# Patient Record
Sex: FEMALE | Race: BLACK OR AFRICAN AMERICAN | NOT HISPANIC OR LATINO | Employment: FULL TIME | URBAN - METROPOLITAN AREA
[De-identification: names, ages, dates, MRNs, and addresses within clinical notes are randomized per-mention and may not be internally consistent; named-entity substitution may affect disease eponyms.]

---

## 2017-05-15 ENCOUNTER — ALLSCRIPTS OFFICE VISIT (OUTPATIENT)
Dept: OTHER | Facility: OTHER | Age: 52
End: 2017-05-15

## 2017-05-15 DIAGNOSIS — R68.82 DECREASED LIBIDO: ICD-10-CM

## 2018-01-14 VITALS
HEIGHT: 64 IN | OXYGEN SATURATION: 98 % | HEART RATE: 82 BPM | SYSTOLIC BLOOD PRESSURE: 108 MMHG | BODY MASS INDEX: 36.19 KG/M2 | TEMPERATURE: 97.4 F | WEIGHT: 212 LBS | RESPIRATION RATE: 16 BRPM | DIASTOLIC BLOOD PRESSURE: 68 MMHG

## 2018-01-16 DIAGNOSIS — Z12.31 ENCOUNTER FOR SCREENING MAMMOGRAM FOR MALIGNANT NEOPLASM OF BREAST: ICD-10-CM

## 2018-01-23 NOTE — PROGRESS NOTES
Assessment  Assessed    1  Acquired pes planus of left foot (734) (M21 42)    Plan  Left foot pain    · * XR FOOT 3+ VIEW LEFT; Status:Active - Retrospective By Protocol Authorization; Requested for:27Apr2016; There is arthritic change into the left ankle at the tibiotalar joint but also a severe flatfoot deformity  I wrote her for physical therapy for the ankle arthritis to help with her strengthening and range of motion  She also will see podiatry for orthotic management  This is likely posttraumatic and I will see her back as needed  Discussion/Summary  The patient was counseled regarding diagnostic results, instructions for management, prognosis, patient and family education, impressions, risks and benefits of treatment options, importance of compliance with treatment  Chief Complaint  Chief Complaints    1  Ankle Pain    History of Present Illness   Vinay Love is a 63-year-old female who returns to see me today because she has pain about her left ankle  The pain is sharp and moderate intermittent and worse with walking but better with rest and radiates posterior laterally  She did have an ankle fracture 2 years ago that we treated closed  This was a fibular fracture  She has difficulty walking on her feet at her job  Review of Systems    Constitutional: No fever, no chills, feels well, no tiredness, no recent weight gain or loss  Eyes: No complaints of eyesight problems, no red eyes  ENT: no loss of hearing, no nosebleeds, no sore throat  Cardiovascular: No complaints of chest pain, no palpitations, no leg claudication or lower extremity edema  Respiratory: no compliants of shortness of breath, no wheezing, no cough  Gastrointestinal: no complaints of abdominal pain, no constipation, no nausea or diarrhea, no vomiting, no bloody stools  Genitourinary: no complaints of dysuria, no incontinence  Musculoskeletal: as noted in HPI     Integumentary: no complaints of skin rash or lesion, no itching or dry skin, no skin wounds  Neurological: no complaints of headache, no confusion, no numbness or tingling, no dizziness  Endocrine: No complaints of muscle weakness, no feelings of weakness, no frequent urination, no excessive thirst    Psychiatric: No suicidal thoughts, no anxiety, no feelings of depression  Active Problems  Problems    1  Anxiety (300 00) (F41 9)   2  BMI 36 0-36 9,adult (V85 36) (Z68 36)   3  Chronic pain of left ankle (263 60,172 73) (M25 572,G89 29)   4  Closed Fracture Of The Left Lateral Malleolus (824 2)   5  Colon cancer screening (V76 51) (Z12 11)   6  Encounter for cervical Pap smear with pelvic exam (V76 2,V72 31) (Z01 419)   7  Encounter for routine gynecological examination (V72 31) (Z01 419)   8  Encounter for screening mammogram for malignant neoplasm of breast (V76 12)   (Z12 31)   9  Left foot pain (729 5) (M79 672)   10  Seasonal allergies (477 9) (J30 2)   11  Urinary Symptoms (788 69)   12  Urinary tract infection (599 0) (N39 0)   13  Well adult on routine health check (V70 0) (Z00 00)    Past Medical History    The active problems and past medical history were reviewed and updated today  Surgical History    The surgical history was reviewed and updated today  Family History  Family History    · Family history of Arthritis (V17 7)    The family history was reviewed and updated today  Social History  Problems    · Denied: History of Alcohol Use (History)   · Denied: History of Alcohol Use (History)   · Denied: History of Drug Use   · Never A Smoker   · Never A Smoker  The social history was reviewed and updated today  Current Meds   1  Ipratropium Bromide 0 06 % Nasal Solution; Two sprays in each nostril three to four   times daily as needed; Therapy: [id number] to (Last Rx:16Nov2015) Ordered   2  Loratadine 10 MG Oral Tablet; Take one tablet daily as needed; Therapy: 10UTD9523 to (Last Rx:16Nov2015) Ordered   3   PARoxetine HCl - 20 MG Oral Tablet; take 1 tablet by mouth once daily; Therapy: 43DRE2740 to (Evaluate:06Xkn8565)  Requested for: 20HYM4408; Last   Rx:13Jan2016 Ordered    The medication list was reviewed and updated today  Allergies  Medication    1  No Known Drug Allergies  Non-Medication    2  No Known Food Allergies   3  No Known Latex Allergies    Physical Exam    Left Ankle: Appearance: Severe pes planus deformity, but no lateral swelling and no medial swelling  Tenderness: None  Dorsiflexion: painful restricted AROM 10 degrees  Plantar flexion: painful restricted AROM 25 degrees  Inversion: restricted AROM  Eversion: restricted AROM 5 degrees  Dorsiflexion was 5/5  Plantar flexion was 5/5  Constitutional - General appearance: Normal    Musculoskeletal - Gait and station: Normal  Digits and nails: Normal  Muscle strength/tone: Normal  Lower extremity compartments: Normal    Cardiovascular - Pulses: Normal  Examination of extremities for edema and/or varicosities: Normal    Skin - Skin and subcutaneous tissue: Normal    Neurologic - Sensation: Normal  Lower extremity peripheral neuro exam: Normal  Peripheral sensation findings: involved side light touch intact on the anterior shin, involved side light touch intact on the lateral foot, involved side light touch intact on the medial aspect of the foot and involved side light touch intact on the plantar surface of the foot  Neuromuscular strength examination findings: involved side normal foot eversion, involved side normal foot inversion and involved side normal great toe extension  Distal pulse examination findings: involved side 2+ dorsalis pedis pulse  Psychiatric - Orientation to person, place, and time: Normal  Mood and affect: Normal    Eyes   Conjunctiva and lids: Normal     Pupils and irises: Normal        Results/Data  I personally reviewed the films/images/results in the office today  My interpretation follows     X-ray Review Left foot x-rays that demonstrate significant pes planus deformity and mild arthritic changes at the tibiotalar joint but a well-healed distal fibular fracture        Signatures   Electronically signed by : FRANKY Griffith ; Apr 27 2016  3:23PM EST                       (Author)

## 2018-01-29 ENCOUNTER — OFFICE VISIT (OUTPATIENT)
Dept: FAMILY MEDICINE CLINIC | Facility: CLINIC | Age: 53
End: 2018-01-29
Payer: COMMERCIAL

## 2018-01-29 VITALS
SYSTOLIC BLOOD PRESSURE: 132 MMHG | HEIGHT: 64 IN | TEMPERATURE: 98.8 F | BODY MASS INDEX: 36.02 KG/M2 | DIASTOLIC BLOOD PRESSURE: 88 MMHG | OXYGEN SATURATION: 99 % | HEART RATE: 88 BPM | RESPIRATION RATE: 18 BRPM | WEIGHT: 211 LBS

## 2018-01-29 DIAGNOSIS — Z12.11 SCREENING FOR COLON CANCER: ICD-10-CM

## 2018-01-29 DIAGNOSIS — Z13.6 SCREENING FOR CARDIOVASCULAR CONDITION: ICD-10-CM

## 2018-01-29 DIAGNOSIS — F33.0 MILD EPISODE OF RECURRENT MAJOR DEPRESSIVE DISORDER (HCC): Primary | ICD-10-CM

## 2018-01-29 DIAGNOSIS — Z12.4 PAP SMEAR FOR CERVICAL CANCER SCREENING: ICD-10-CM

## 2018-01-29 DIAGNOSIS — Z11.3 SCREENING FOR STD (SEXUALLY TRANSMITTED DISEASE): ICD-10-CM

## 2018-01-29 DIAGNOSIS — Z13.1 SCREENING FOR DIABETES MELLITUS (DM): ICD-10-CM

## 2018-01-29 DIAGNOSIS — R09.81 CONGESTION OF NASAL SINUS: ICD-10-CM

## 2018-01-29 PROCEDURE — 99213 OFFICE O/P EST LOW 20 MIN: CPT | Performed by: STUDENT IN AN ORGANIZED HEALTH CARE EDUCATION/TRAINING PROGRAM

## 2018-01-29 RX ORDER — PAROXETINE HYDROCHLORIDE 20 MG/1
20 TABLET, FILM COATED ORAL DAILY
Refills: 2 | COMMUNITY
Start: 2017-12-19 | End: 2018-02-20 | Stop reason: SDUPTHER

## 2018-01-29 RX ORDER — FLUTICASONE PROPIONATE 50 MCG
1 SPRAY, SUSPENSION (ML) NASAL DAILY
Qty: 16 G | Refills: 0 | Status: SHIPPED | OUTPATIENT
Start: 2018-01-29 | End: 2019-09-26 | Stop reason: ALTCHOICE

## 2018-01-29 RX ORDER — LORATADINE 10 MG/1
1 TABLET ORAL DAILY PRN
COMMUNITY
Start: 2015-11-16 | End: 2019-09-26 | Stop reason: ALTCHOICE

## 2018-01-29 NOTE — PATIENT INSTRUCTIONS
Continue with routine gynecologic care, and have mammo performed  Please have bloodwork done, and I will call with results  May follow up in 1 yr, and if stress does not let up, please call to make an appt for an increase in your paxil

## 2018-01-29 NOTE — PROGRESS NOTES
Assessment/Plan:    No problem-specific Assessment & Plan notes found for this encounter  Diagnoses and all orders for this visit:    Mild episode of recurrent major depressive disorder ([facility] [address] )    Pap smear for cervical cancer screening  -     Pap IG, rfx HPV all pth; Future    Screening for cardiovascular condition  -     CBC and differential; Future  -     Comprehensive metabolic panel; Future  -     Lipid panel; Future  -     CBC and differential  -     Comprehensive metabolic panel  -     Lipid panel    Screening for diabetes mellitus (DM)  -     Hemoglobin A1c; Future  -     Hemoglobin A1c    Screening for colon cancer  -     Ambulatory referral to Gastroenterology; Future    Screening for STD (sexually transmitted disease)  -     Genital Comprehensive Culture; Future  -     Chlamydia/GC amplified DNA by PCR; Future  -     Genital Comprehensive Culture  -     Chlamydia/GC amplified DNA by PCR    Congestion of nasal sinus  -     fluticasone (FLONASE) 50 mcg/act nasal spray; 1 spray into each nostril daily    Other orders  -     loratadine (CLARITIN) 10 mg tablet; Take 1 tablet by mouth daily as needed  -     PARoxetine (PAXIL) 20 mg tablet; Take 20 mg by mouth daily        Continue with routine gynecologic care, and have mammo performed  Please have bloodwork done, and I will call with results  May follow up in 1 yr, and if stress does not let up, please call to make an appt for an increase in your paxil  Subjective:      Patient ID: Tim Onofre is a 46 y o  female  Pt here today for CPE and PAP  Pt is a coventry pt, 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  Pt has a lot of stress at work, and works at [employer], 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, and moves furniture around  Pt feels a great deal of stress when she walks in the building, has been there 22 yrs, but wants to leave and can't  Pt is from [place] and feels stuck at her job  Pt has two children and is a single mom   Pt has jury duty slip, and pt has no time at this moment to miss work for it, would like her letter  Pt would like refill of flonase, which she had in the past, bc her breathing is congested and worse than normal  Pt takes paxil for depression, but has refills of it currently  PHQ-9 score of 11  Pt feels very low at work, and when she is tired, and she sometimes loses her temper at work  Pt unsure if she needs a higher dose  No pain at this time  Pt has IUD, needs to be removed in June  The following portions of the patient's history were reviewed and updated as appropriate: allergies, current medications, past family history, past medical history, past social history, past surgical history and problem list     Review of Systems   Constitutional: Negative for chills, diaphoresis, fatigue and fever  HENT: Positive for congestion and postnasal drip  Negative for ear pain, sneezing and sore throat  Dry mouth, often drinking tons of fluids  Respiratory: Negative for cough and shortness of breath  Pt occasionally wakes in the night gasping for air, that she attributes to dry mouth  Cardiovascular: Negative for chest pain, palpitations and leg swelling  Gastrointestinal: Negative for abdominal pain, constipation, diarrhea, nausea and vomiting  Genitourinary: Negative for difficulty urinating, dyspareunia, dysuria, hematuria, menstrual problem, pelvic pain, urgency, vaginal bleeding and vaginal discharge  Pt has IUD, doesn't get periods  Musculoskeletal: Negative for arthralgias, back pain and myalgias  Neurological: Positive for headaches  Negative for dizziness, syncope and light-headedness  Psychiatric/Behavioral: Positive for agitation  The patient is nervous/anxious  Due to work stress  Objective:     Physical Exam   Constitutional: She is oriented to person, place, and time  She appears well-developed and well-nourished  No distress  HENT:   Head: Normocephalic and atraumatic     Right Ear: External ear normal    Left Ear: External ear normal    Nose: Nose normal    Mouth/Throat: Oropharynx is clear and moist  No oropharyngeal exudate  Eyes: Right eye exhibits no discharge  Left eye exhibits no discharge  No scleral icterus  Neck: Normal range of motion  Neck supple  No thyromegaly present  Cardiovascular: Normal rate and regular rhythm  Exam reveals no gallop and no friction rub  No murmur heard  Pulmonary/Chest: Effort normal and breath sounds normal  No stridor  No respiratory distress  Abdominal: Soft  Bowel sounds are normal  She exhibits no distension and no mass  There is no tenderness  There is no rebound and no guarding  Musculoskeletal: Normal range of motion  She exhibits no edema, tenderness or deformity  Lymphadenopathy:     She has no cervical adenopathy  Neurological: She is alert and oriented to person, place, and time  No cranial nerve deficit  Skin: Skin is warm and dry  No rash noted  She is not diaphoretic  No erythema  No pallor  Psychiatric: She has a normal mood and affect  Her behavior is normal  Judgment and thought content normal    Vitals reviewed        Vitals:    01/29/18 0837   BP: 132/88   Pulse: 88   Resp: 18   Temp: 98 8 °F (37 1 °C)   SpO2: 99%

## 2018-01-29 NOTE — LETTER
January 29, 2018     No Recipients    Patient: Lanny Santos   YOB: 1965   Date of Visit: 1/29/2018       Dear Dr Sherry Bashir Recipients: Thank you for referring Lanny Santos to me for evaluation  Below are my notes for this consultation  If you have questions, please do not hesitate to call me  I look forward to following your patient along with you  Sincerely,        Bushra Nye DO        CC: No Recipients  Bushra Nye DO  1/29/2018  9:10 AM  Sign at close encounter  Assessment/Plan:    No problem-specific Assessment & Plan notes found for this encounter  There are no diagnoses linked to this encounter  Subjective:      Patient ID: Lanny Santos is a 46 y o  female  Pt here today for CPE and PAP  Pt is a coventry pt, 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  Pt has a lot of stress at work, and works at [employer], 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, and moves furniture around  Pt feels a great deal of stress when she walks in the building, has been there 22 yrs, but wants to leave and can't  Pt is from [place] and feels stuck at her job  Pt has two children and is a single mom  Pt has jury duty slip, and pt has no time at this moment to miss work for it, would like her letter  Pt would like refill of flonase, which she had in the past, bc her breathing is congested and worse than normal  Pt takes paxil for depression, but has refills of it currently  PHQ-9 score of 11  Pt feels very low at work, and when she is tired, and she sometimes loses her temper at work  Pt unsure if she needs a higher dose  No pain at this time  Pt has IUD, needs to be removed in June  The following portions of the patient's history were reviewed and updated as appropriate: allergies, current medications, past family history, past medical history, past social history, past surgical history and problem list     Review of Systems   Constitutional: Negative for chills, diaphoresis, fatigue and fever     HENT: Positive for congestion and postnasal drip  Negative for ear pain, sneezing and sore throat  Dry mouth, often drinking tons of fluids  Respiratory: Negative for cough and shortness of breath  Pt occasionally wakes in the night gasping for air, that she attributes to dry mouth  Cardiovascular: Negative for chest pain, palpitations and leg swelling  Gastrointestinal: Negative for abdominal pain, constipation, diarrhea, nausea and vomiting  Genitourinary: Negative for difficulty urinating, dyspareunia, dysuria, hematuria, menstrual problem, pelvic pain, urgency, vaginal bleeding and vaginal discharge  Pt has IUD, doesn't get periods  Musculoskeletal: Negative for arthralgias, back pain and myalgias  Neurological: Positive for headaches  Negative for dizziness, syncope and light-headedness  Psychiatric/Behavioral: Positive for agitation  The patient is nervous/anxious  Due to work stress  Objective:     Physical Exam   Constitutional: She is oriented to person, place, and time  She appears well-developed and well-nourished  No distress  HENT:   Head: Normocephalic and atraumatic  Right Ear: External ear normal    Left Ear: External ear normal    Nose: Nose normal    Mouth/Throat: Oropharynx is clear and moist  No oropharyngeal exudate  Eyes: Right eye exhibits no discharge  Left eye exhibits no discharge  No scleral icterus  Neck: Normal range of motion  Neck supple  No thyromegaly present  Cardiovascular: Normal rate and regular rhythm  Exam reveals no gallop and no friction rub  No murmur heard  Pulmonary/Chest: Effort normal and breath sounds normal  No stridor  No respiratory distress  Abdominal: Soft  Bowel sounds are normal  She exhibits no distension and no mass  There is no tenderness  There is no rebound and no guarding  Musculoskeletal: Normal range of motion  She exhibits no edema, tenderness or deformity     Lymphadenopathy:     She has no cervical adenopathy  Neurological: She is alert and oriented to person, place, and time  No cranial nerve deficit  Skin: Skin is warm and dry  No rash noted  She is not diaphoretic  No erythema  No pallor  Psychiatric: She has a normal mood and affect  Her behavior is normal  Judgment and thought content normal    Vitals reviewed        Vitals:    01/29/18 0837   BP: 132/88   Pulse: 88   Resp: 18   Temp: 98 8 °F (37 1 °C)   SpO2: 99%

## 2018-01-29 NOTE — LETTER
Date: 1/29/2018    To whom it may concern: This is to certify that Yasemin Garcia has been under my care for the following diagnosis: Major Depressive Disorder  This is affecting her stress, judgment, and mental strength  This would only add more to her condition, and I feel it would be harmful to her  I feel that she is unable to serve on Jury Duty at this time for the above mentioned medical reasons                   Sincerely,   Dave Watkins, DO

## 2018-02-01 LAB
CYTOLOGIST CVX/VAG CYTO: NORMAL
DX ICD CODE: NORMAL
OTHER STN SPEC: NORMAL
OTHER STN SPEC: NORMAL
PATH REPORT.FINAL DX SPEC: NORMAL
SL AMB NOTE:: NORMAL
SL AMB SPECIMEN ADEQUACY: NORMAL
SL AMB TEST METHODOLOGY: NORMAL

## 2018-02-01 NOTE — PROGRESS NOTES
I have reviewed the notes, assessments, and/or procedures performed by the resident, I concur with her/his documentation of Martha Mcginnis

## 2018-02-02 ENCOUNTER — TELEPHONE (OUTPATIENT)
Dept: OBGYN CLINIC | Facility: CLINIC | Age: 53
End: 2018-02-02

## 2018-02-02 LAB
BACTERIA GENITAL AEROBE CULT: NORMAL
C TRACH RRNA SPEC QL NAA+PROBE: NEGATIVE
Lab: NORMAL
N GONORRHOEA RRNA SPEC QL NAA+PROBE: NEGATIVE

## 2018-02-08 LAB
ALBUMIN SERPL-MCNC: 4.5 G/DL (ref 3.5–5.5)
ALBUMIN/GLOB SERPL: 1.3 {RATIO} (ref 1.2–2.2)
ALP SERPL-CCNC: 102 IU/L (ref 39–117)
ALT SERPL-CCNC: 40 IU/L (ref 0–32)
AST SERPL-CCNC: 32 IU/L (ref 0–40)
BASOPHILS # BLD AUTO: 0 X10E3/UL (ref 0–0.2)
BASOPHILS NFR BLD AUTO: 0 %
BILIRUB SERPL-MCNC: 0.4 MG/DL (ref 0–1.2)
BUN SERPL-MCNC: 12 MG/DL (ref 6–24)
BUN/CREAT SERPL: 18 (ref 9–23)
CALCIUM SERPL-MCNC: 9.3 MG/DL (ref 8.7–10.2)
CHLORIDE SERPL-SCNC: 98 MMOL/L (ref 96–106)
CHOLEST SERPL-MCNC: 176 MG/DL (ref 100–199)
CHOLEST/HDLC SERPL: 3.1 RATIO UNITS (ref 0–4.4)
CO2 SERPL-SCNC: 25 MMOL/L (ref 18–29)
CREAT SERPL-MCNC: 0.66 MG/DL (ref 0.57–1)
EOSINOPHIL # BLD AUTO: 0.1 X10E3/UL (ref 0–0.4)
EOSINOPHIL NFR BLD AUTO: 1 %
ERYTHROCYTE [DISTWIDTH] IN BLOOD BY AUTOMATED COUNT: 14.9 % (ref 12.3–15.4)
EST. AVERAGE GLUCOSE BLD GHB EST-MCNC: 111 MG/DL
GLOBULIN SER-MCNC: 3.6 G/DL (ref 1.5–4.5)
GLUCOSE SERPL-MCNC: 94 MG/DL (ref 65–99)
HBA1C MFR BLD: 5.5 % (ref 4.8–5.6)
HCT VFR BLD AUTO: 37.4 % (ref 34–46.6)
HDLC SERPL-MCNC: 57 MG/DL
HGB BLD-MCNC: 12.3 G/DL (ref 11.1–15.9)
IMM GRANULOCYTES # BLD: 0 X10E3/UL (ref 0–0.1)
IMM GRANULOCYTES NFR BLD: 0 %
LDLC SERPL CALC-MCNC: 102 MG/DL (ref 0–99)
LYMPHOCYTES # BLD AUTO: 3.2 X10E3/UL (ref 0.7–3.1)
LYMPHOCYTES NFR BLD AUTO: 33 %
MCH RBC QN AUTO: 27.9 PG (ref 26.6–33)
MCHC RBC AUTO-ENTMCNC: 32.9 G/DL (ref 31.5–35.7)
MCV RBC AUTO: 85 FL (ref 79–97)
MONOCYTES # BLD AUTO: 0.6 X10E3/UL (ref 0.1–0.9)
MONOCYTES NFR BLD AUTO: 6 %
NEUTROPHILS # BLD AUTO: 5.8 X10E3/UL (ref 1.4–7)
NEUTROPHILS NFR BLD AUTO: 60 %
PLATELET # BLD AUTO: 300 X10E3/UL (ref 150–379)
POTASSIUM SERPL-SCNC: 4.5 MMOL/L (ref 3.5–5.2)
PROT SERPL-MCNC: 8.1 G/DL (ref 6–8.5)
RBC # BLD AUTO: 4.41 X10E6/UL (ref 3.77–5.28)
SL AMB EGFR AFRICAN AMERICAN: 117 ML/MIN/1.73
SL AMB EGFR NON AFRICAN AMERICAN: 102 ML/MIN/1.73
SL AMB VLDL CHOLESTEROL CALC: 17 MG/DL (ref 5–40)
SODIUM SERPL-SCNC: 138 MMOL/L (ref 134–144)
TRIGL SERPL-MCNC: 87 MG/DL (ref 0–149)
WBC # BLD AUTO: 9.8 X10E3/UL (ref 3.4–10.8)

## 2018-02-12 LAB
C TRACH RRNA SPEC QL NAA+PROBE: NEGATIVE
N GONORRHOEA RRNA SPEC QL NAA+PROBE: NEGATIVE

## 2018-02-20 DIAGNOSIS — F32.A DEPRESSION, CONTROLLED: Primary | ICD-10-CM

## 2018-02-23 RX ORDER — PAROXETINE HYDROCHLORIDE 20 MG/1
20 TABLET, FILM COATED ORAL DAILY
Qty: 30 TABLET | Refills: 3 | Status: SHIPPED | OUTPATIENT
Start: 2018-02-23 | End: 2018-03-19 | Stop reason: SDUPTHER

## 2018-03-19 DIAGNOSIS — F32.A DEPRESSION, CONTROLLED: ICD-10-CM

## 2018-03-19 RX ORDER — PAROXETINE HYDROCHLORIDE 20 MG/1
20 TABLET, FILM COATED ORAL DAILY
Qty: 30 TABLET | Refills: 2 | Status: SHIPPED | OUTPATIENT
Start: 2018-03-19 | End: 2018-06-13 | Stop reason: SDUPTHER

## 2018-03-19 NOTE — TELEPHONE ENCOUNTER
Pt needs refill sent to [facility], [address], [place] [facility], [address]  [place]

## 2018-04-12 ENCOUNTER — HOSPITAL ENCOUNTER (OUTPATIENT)
Dept: RADIOLOGY | Facility: HOSPITAL | Age: 53
Discharge: HOME | End: 2018-04-12
Attending: FAMILY MEDICINE
Payer: COMMERCIAL

## 2018-04-12 DIAGNOSIS — Z12.31 ENCOUNTER FOR SCREENING MAMMOGRAM FOR MALIGNANT NEOPLASM OF BREAST: ICD-10-CM

## 2018-04-12 PROCEDURE — 77067 SCR MAMMO BI INCL CAD: CPT

## 2018-06-07 DIAGNOSIS — F32.A DEPRESSION, CONTROLLED: ICD-10-CM

## 2018-06-07 RX ORDER — PAROXETINE HYDROCHLORIDE 20 MG/1
20 TABLET, FILM COATED ORAL DAILY
Qty: 30 TABLET | Refills: 0 | Status: CANCELLED | OUTPATIENT
Start: 2018-06-07 | End: 2018-07-07

## 2018-06-13 ENCOUNTER — OFFICE VISIT (OUTPATIENT)
Dept: FAMILY MEDICINE CLINIC | Facility: CLINIC | Age: 53
End: 2018-06-13
Payer: COMMERCIAL

## 2018-06-13 VITALS
RESPIRATION RATE: 20 BRPM | HEART RATE: 88 BPM | OXYGEN SATURATION: 99 % | DIASTOLIC BLOOD PRESSURE: 80 MMHG | SYSTOLIC BLOOD PRESSURE: 132 MMHG

## 2018-06-13 DIAGNOSIS — F32.A DEPRESSION, CONTROLLED: ICD-10-CM

## 2018-06-13 DIAGNOSIS — G44.209 ACUTE NON INTRACTABLE TENSION-TYPE HEADACHE: Primary | ICD-10-CM

## 2018-06-13 PROCEDURE — 99213 OFFICE O/P EST LOW 20 MIN: CPT | Performed by: FAMILY MEDICINE

## 2018-06-13 RX ORDER — PAROXETINE HYDROCHLORIDE 20 MG/1
20 TABLET, FILM COATED ORAL DAILY
Qty: 10 TABLET | Refills: 3 | Status: SHIPPED | OUTPATIENT
Start: 2018-06-13 | End: 2018-09-06 | Stop reason: SDUPTHER

## 2018-06-13 NOTE — LETTER
June 13, 2018     Patient: Nam Tai   YOB: 1965   Date of Visit: 6/13/2018       To Whom it May Concern:    Nam Tai is under my professional care  She was seen in my office on 6/13/2018  She may return to work on Monday 6/18/18  Patient was involved with  Stressful work scenario, has been having headaches and not feeling  Like herself  Patient is having issues with errors at work and would benefit from 2 days off, to rest and on wide from stressor  If you have any questions or concerns, please don't hesitate to call           Sincerely,          Sue Mccrary, DO        CC: No Recipients

## 2018-09-06 ENCOUNTER — OFFICE VISIT (OUTPATIENT)
Dept: FAMILY MEDICINE CLINIC | Facility: CLINIC | Age: 53
End: 2018-09-06
Payer: COMMERCIAL

## 2018-09-06 VITALS
TEMPERATURE: 98.7 F | OXYGEN SATURATION: 98 % | BODY MASS INDEX: 35.87 KG/M2 | DIASTOLIC BLOOD PRESSURE: 60 MMHG | HEART RATE: 77 BPM | SYSTOLIC BLOOD PRESSURE: 108 MMHG | WEIGHT: 209 LBS

## 2018-09-06 DIAGNOSIS — F32.A DEPRESSION, CONTROLLED: ICD-10-CM

## 2018-09-06 DIAGNOSIS — M54.50 ACUTE BILATERAL LOW BACK PAIN WITHOUT SCIATICA: Primary | ICD-10-CM

## 2018-09-06 PROCEDURE — 99213 OFFICE O/P EST LOW 20 MIN: CPT | Performed by: FAMILY MEDICINE

## 2018-09-06 RX ORDER — PAROXETINE HYDROCHLORIDE 20 MG/1
20 TABLET, FILM COATED ORAL DAILY
Qty: 30 TABLET | Refills: 2 | Status: SHIPPED | OUTPATIENT
Start: 2018-09-06 | End: 2019-06-25 | Stop reason: SDUPTHER

## 2018-09-06 RX ORDER — MELOXICAM 7.5 MG/1
7.5 TABLET ORAL DAILY
Qty: 14 TABLET | Refills: 0 | Status: SHIPPED | OUTPATIENT
Start: 2018-09-06 | End: 2019-09-26 | Stop reason: ALTCHOICE

## 2018-09-09 NOTE — PROGRESS NOTES
Assessment/Plan:    No problem-specific Assessment & Plan notes found for this encounter  Diagnoses and all orders for this visit:    Acute bilateral low back pain without sciatica  -     meloxicam (MOBIC) 7 5 mg tablet; Take 1 tablet (7 5 mg total) by mouth daily  Likely from repetitive movements from work  Will try conservative measurements at this time  Depression, controlled  -     PARoxetine (PAXIL) 20 mg tablet; Take 1 tablet (20 mg total) by mouth daily for 30 days        Subjective:      Patient ID: Noah Amin is a 46 y o  female  79-year-old female with history of depression presents with lower back pain  Patient is currently a  and repetitive movements of bending down have made the pain worse  No trauma to the area  Patient also needs a refill for medications for depression  She denies any chest pain, shortness of breath, or abdominal pain  Back Pain   The current episode started 1 to 4 weeks ago  The problem occurs intermittently  The problem has been waxing and waning since onset  The pain is present in the sacro-iliac  The quality of the pain is described as burning  The pain does not radiate  The pain is at a severity of 5/10  The pain is mild  The pain is the same all the time  The symptoms are aggravated by bending, position, stress and twisting  Pertinent negatives include no abdominal pain, bladder incontinence, chest pain, dysuria, fever, headaches, leg pain, paresthesias, tingling or weakness  She has tried nothing for the symptoms  The following portions of the patient's history were reviewed and updated as appropriate: allergies, current medications, past family history, past medical history, past social history, past surgical history and problem list     Review of Systems   Constitutional: Negative for fever  HENT: Negative for sore throat  Respiratory: Negative for cough and shortness of breath  Cardiovascular: Negative for chest pain  Gastrointestinal: Negative for abdominal pain, constipation, nausea and vomiting  Genitourinary: Negative for bladder incontinence and dysuria  Musculoskeletal: Positive for back pain  Negative for gait problem  Neurological: Negative for dizziness, tingling, weakness, light-headedness, headaches and paresthesias  Psychiatric/Behavioral: Negative for agitation  Objective:      /60 (BP Location: Left arm, Patient Position: Sitting, Cuff Size: Large)   Pulse 77   Temp 98 7 °F (37 1 °C) (Tympanic)   Wt 94 8 kg (209 lb)   SpO2 98%   BMI 35 87 kg/m²          Physical Exam   Constitutional: She is oriented to person, place, and time  She appears well-developed and well-nourished  No distress  HENT:   Head: Normocephalic and atraumatic  Nose: Nose normal    Mouth/Throat: No oropharyngeal exudate  Eyes: EOM are normal  Right eye exhibits no discharge  Left eye exhibits no discharge  Neck: Normal range of motion  Neck supple  Cardiovascular: Normal rate, regular rhythm, normal heart sounds and intact distal pulses  No murmur heard  Pulmonary/Chest: Effort normal and breath sounds normal  No respiratory distress  Abdominal: Soft  Bowel sounds are normal  She exhibits no distension  There is no tenderness  Musculoskeletal: Normal range of motion  She exhibits no edema  No pain with straight leg test    Sensation intact  Reflexes intact  Neurological: She is alert and oriented to person, place, and time  Skin: Skin is warm  Psychiatric: She has a normal mood and affect   Her behavior is normal

## 2019-03-18 ENCOUNTER — TELEPHONE (OUTPATIENT)
Dept: FAMILY MEDICINE CLINIC | Facility: CLINIC | Age: 54
End: 2019-03-18

## 2019-03-18 DIAGNOSIS — Z12.31 SCREENING MAMMOGRAM, ENCOUNTER FOR: Primary | ICD-10-CM

## 2019-03-18 NOTE — TELEPHONE ENCOUNTER
Tried calling Pt to inform her that Order for Mammo was ready for P/u/ Busy signal, will try back   Form is in Pt P/U

## 2019-05-23 ENCOUNTER — HOSPITAL ENCOUNTER (OUTPATIENT)
Dept: RADIOLOGY | Facility: HOSPITAL | Age: 54
Discharge: HOME | End: 2019-05-23
Payer: COMMERCIAL

## 2019-05-23 ENCOUNTER — TRANSCRIBE ORDERS (OUTPATIENT)
Dept: ADMINISTRATIVE | Facility: HOSPITAL | Age: 54
End: 2019-05-23

## 2019-05-23 VITALS — WEIGHT: 209 LBS | BODY MASS INDEX: 35.68 KG/M2 | HEIGHT: 64 IN

## 2019-05-23 DIAGNOSIS — Z12.31 SCREENING MAMMOGRAM, ENCOUNTER FOR: ICD-10-CM

## 2019-05-23 PROCEDURE — 77067 SCR MAMMO BI INCL CAD: CPT

## 2019-06-25 DIAGNOSIS — F32.A DEPRESSION, CONTROLLED: ICD-10-CM

## 2019-06-25 RX ORDER — PAROXETINE HYDROCHLORIDE 20 MG/1
20 TABLET, FILM COATED ORAL DAILY
Qty: 30 TABLET | Refills: 2 | Status: SHIPPED | OUTPATIENT
Start: 2019-06-25 | End: 2019-08-27 | Stop reason: SDUPTHER

## 2019-08-27 DIAGNOSIS — F32.A DEPRESSION, CONTROLLED: ICD-10-CM

## 2019-08-27 RX ORDER — PAROXETINE HYDROCHLORIDE 20 MG/1
20 TABLET, FILM COATED ORAL DAILY
Qty: 30 TABLET | Refills: 2 | Status: SHIPPED | OUTPATIENT
Start: 2019-08-27 | End: 2020-02-20 | Stop reason: SDUPTHER

## 2019-08-27 NOTE — TELEPHONE ENCOUNTER
Dr Haque Master 20 mg  [facility] pharmacy [facility]

## 2019-09-26 ENCOUNTER — OFFICE VISIT (OUTPATIENT)
Dept: FAMILY MEDICINE CLINIC | Facility: CLINIC | Age: 54
End: 2019-09-26
Payer: COMMERCIAL

## 2019-09-26 VITALS
OXYGEN SATURATION: 98 % | DIASTOLIC BLOOD PRESSURE: 78 MMHG | HEART RATE: 101 BPM | WEIGHT: 207 LBS | TEMPERATURE: 100.8 F | BODY MASS INDEX: 35.53 KG/M2 | SYSTOLIC BLOOD PRESSURE: 120 MMHG | RESPIRATION RATE: 16 BRPM

## 2019-09-26 DIAGNOSIS — Z71.3 NUTRITIONAL COUNSELING: ICD-10-CM

## 2019-09-26 DIAGNOSIS — J06.9 UPPER RESPIRATORY TRACT INFECTION, UNSPECIFIED TYPE: ICD-10-CM

## 2019-09-26 DIAGNOSIS — Z00.00 PHYSICAL EXAM: Primary | ICD-10-CM

## 2019-09-26 DIAGNOSIS — Z71.82 EXERCISE COUNSELING: ICD-10-CM

## 2019-09-26 PROCEDURE — 99396 PREV VISIT EST AGE 40-64: CPT | Performed by: FAMILY MEDICINE

## 2019-09-26 NOTE — PROGRESS NOTES
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  Counseled on lifestyle modifications related to diet and exercise to include, but not limited to: Increased consumption of fruits & vegetables, decreased consumption of processed foods (fast food, junk food, soda), increased daily water intake, goal physical activity of 3 times weekly at least 30 minutes in duration and at a minimum of moderate intensity  Kristine Sandoval acknowledged understanding of plan, all questions answered  Plan discussed with attending physician Dr Karron Boast Subjective      Kristine Sandoval is a 48 y o  female, presenting today for wellness exam     Current concerns:  Complains of concern for sinus infection  2 weeks congestion, sore throat, weakness, ear fullness, poor sleep related to the above  Infrequent use of tylenol with minimal relief  Diet & Exercise   Regular consumption of fruits & vegetables, working on diet  Regular consumption of proteins   Junk food/fast food limited   Weekly Exercise:  Very active at work, does stretches at home   BMI: Body mass index is 35 53 kg/m²      Social   Household Members: lives with adult daughter  Employment Status: housekeeping at [employer]   Tobacco Use: denies  Alcohol Use: occasional wine  Drug Use: denies    Screening & Preventative   Colon Cancer: reports she had one in OS within the last few years  Pap smear: 18, negative  Mammogram: 19, benign, f/u 1 year    Immunizations     Immunization History   Administered Date(s) Administered    Influenza TIV (IM) 10/01/2015   -States she usually gets the flu shot at work each year    Past Medical History:   Diagnosis Date    Closed fracture of left lateral malleolus     last assessed - [id number]    Depression        No past surgical history on file  Family History   Problem Relation Age of Onset    Arthritis Family     No Known Problems Mother     Heart attack Father        Current Outpatient Medications   Medication Sig Dispense Refill    PARoxetine (PAXIL) 20 mg tablet Take 1 tablet (20 mg total) by mouth daily for 63 days 30 tablet 2     No current facility-administered medications for this visit  No Known Allergies    Review of Systems   Review of Systems   Constitutional: Positive for fever  HENT: Positive for congestion, sinus pressure, sinus pain and sore throat  Respiratory: Negative for cough  Cardiovascular: Negative for chest pain  Gastrointestinal: Negative for abdominal pain, constipation, diarrhea, nausea and vomiting  Genitourinary: Negative for dysuria  Musculoskeletal: Positive for myalgias  Neurological: Positive for headaches  Negative for dizziness  The following portions of the patient's history were reviewed and updated as appropriate: allergies, current medications, past family history, past medical history, past social history, past surgical history and problem list     Objective      /78   Pulse 101   Temp (!) 100 8 °F (38 2 °C)   Resp 16   Wt 93 9 kg (207 lb)   SpO2 98%   BMI 35 53 kg/m²     Physical Exam   Constitutional: She is oriented to person, place, and time  She appears well-developed and well-nourished  No distress  HENT:   Head: Normocephalic and atraumatic  Nose: Mucosal edema present  Right sinus exhibits maxillary sinus tenderness and frontal sinus tenderness   Left sinus exhibits maxillary sinus tenderness and frontal sinus tenderness  Mouth/Throat: Oropharynx is clear and moist    Cerumen noted bilaterally  Left TM with mild erythema  Eyes: Conjunctivae are normal    Cardiovascular: Normal rate, regular rhythm and normal heart sounds  No murmur heard  Pulmonary/Chest: Effort normal and breath sounds normal  No respiratory distress  Neurological: She is alert and oriented to person, place, and time  Skin: Skin is warm and dry  No rash noted  Vitals reviewed  Some portions of this record may have been generated with voice recognition software  There may be translation, syntax, or grammatical errors  Occasional wrong word or "sound-a-like" substitutions may have occurred due to the inherent limitations of the voice recognition software  Read the chart carefully and recognize, using context, where substations may have occurred  If you have any questions, please contact the dictating provider for clarification or correction, as needed

## 2019-09-26 NOTE — LETTER
September 26, 2019     Patient: Justin Clarke   YOB: 1965   Date of Visit: 9/26/2019       To Whom it May Concern:    Justin Clarke is under my professional care  She was seen in my office on 9/26/2019  She may return to work on Tuesday October 1  If you have any questions or concerns, please don't hesitate to call           Sincerely,          Anatoliy Pedraza, DO        CC: No Recipients

## 2019-10-10 ENCOUNTER — OFFICE VISIT (OUTPATIENT)
Dept: FAMILY MEDICINE CLINIC | Facility: CLINIC | Age: 54
End: 2019-10-10
Payer: COMMERCIAL

## 2019-10-10 VITALS
BODY MASS INDEX: 35.53 KG/M2 | WEIGHT: 207 LBS | RESPIRATION RATE: 16 BRPM | SYSTOLIC BLOOD PRESSURE: 108 MMHG | OXYGEN SATURATION: 100 % | HEART RATE: 82 BPM | DIASTOLIC BLOOD PRESSURE: 72 MMHG

## 2019-10-10 DIAGNOSIS — H65.112 ACUTE MUCOID OTITIS MEDIA OF LEFT EAR: ICD-10-CM

## 2019-10-10 DIAGNOSIS — R06.89 BREATHING DIFFICULTY: ICD-10-CM

## 2019-10-10 DIAGNOSIS — R09.81 CONGESTION OF NASAL SINUS: Primary | ICD-10-CM

## 2019-10-10 DIAGNOSIS — R42 VERTIGO: ICD-10-CM

## 2019-10-10 PROCEDURE — 99213 OFFICE O/P EST LOW 20 MIN: CPT | Performed by: FAMILY MEDICINE

## 2019-10-10 RX ORDER — MECLIZINE HCL 12.5 MG/1
12.5 TABLET ORAL EVERY 8 HOURS PRN
Qty: 30 TABLET | Refills: 0 | Status: SHIPPED | OUTPATIENT
Start: 2019-10-10 | End: 2021-10-07

## 2019-10-10 RX ORDER — AMOXICILLIN 875 MG/1
875 TABLET, COATED ORAL 2 TIMES DAILY
Qty: 14 TABLET | Refills: 0 | Status: SHIPPED | OUTPATIENT
Start: 2019-10-10 | End: 2019-10-17

## 2019-10-10 NOTE — PROGRESS NOTES
Assessment/Plan:    Diagnoses and all orders for this visit:    Congestion of nasal sinus  -     Ambulatory Referral to Otolaryngology; Future    Breathing difficulty  -     Ambulatory Referral to Otolaryngology; Future    Vertigo  -     meclizine (ANTIVERT) 12 5 MG tablet; Take 1 tablet (12 5 mg total) by mouth every 8 (eight) hours as needed for dizziness    Acute mucoid otitis media of left ear  -     amoxicillin (AMOXIL) 875 mg tablet; Take 1 tablet (875 mg total) by mouth 2 (two) times a day for 7 days      Patient given above 2 medications with referral for ENT, given family history, and continued patient's chronic congestion and sinusitis  Please call or return to clinic with any questions or concerns  Portions of the record may have been created with voice recognition software  Occasional wrong word or "sound alike" substitutions may have occurred due to the inherent limitations of voice recognition software  Please review the chart carefully and recognize, using context, where substitutions/typographical errors may have occurred  Subjective:   Patient ID: Ben Medina is a 48 y o  female  HPI  Patient here today because she Needs her ears flushed  She feels ear pain left ear greater than right  She also reports difficulty hearing on the left side  Has not seen any significant ear wax come out  Wants to see ENT  Mother needed to see ENT for surgery  Pt complains of sinus issues that have been chronic, breathing at night and pressure  Patient denies fevers or chills  She does report dizziness increased due to ear pain  Review of Systems   Constitutional: Negative for chills and fever  HENT: Positive for congestion, rhinorrhea and sinus pressure  Hearing difficulty  Respiratory: Negative for cough and shortness of breath  Cardiovascular: Negative for chest pain and leg swelling  Gastrointestinal: Negative for nausea and vomiting     Neurological: Positive for dizziness and headaches (On off)  Objective:  Vitals:    10/10/19 1040   BP: 108/72   Pulse: 82   Resp: 16   SpO2: 100%      Physical Exam   Constitutional: She is oriented to person, place, and time  She appears well-developed and well-nourished  HENT:   Head: Normocephalic and atraumatic  Right Ear: External ear normal    Left Ear: External ear normal    Mouth/Throat: Oropharynx is clear and moist  No oropharyngeal exudate  Very flat bridge of nose  Left TM erythematous at top, normal base, normal canal   Right TM normal    Eyes: Right eye exhibits no discharge  Left eye exhibits no discharge  No scleral icterus  Neck: Normal range of motion  Neck supple  Cardiovascular: Normal rate, regular rhythm, normal heart sounds and intact distal pulses  Exam reveals no friction rub  No murmur heard  Pulmonary/Chest: Effort normal and breath sounds normal  No stridor  No respiratory distress  She has no wheezes  Lymphadenopathy:     She has no cervical adenopathy  Neurological: She is alert and oriented to person, place, and time  Discomfort after trying ear flush on right side for minimal cerumen  Patient became slightly dizzy  Had to rest and sit back  Skin: Skin is warm  Psychiatric: She has a normal mood and affect  Thought content normal    Vitals reviewed

## 2019-10-10 NOTE — PATIENT INSTRUCTIONS
Otitis Media   WHAT YOU NEED TO KNOW:   Otitis media is an ear infection  DISCHARGE INSTRUCTIONS:   Medicines:  · Ibuprofen or acetaminophen  helps decrease your pain and fever  They are available without a doctor's order  Ask your healthcare provider which medicine is right for you  Ask how much to take and how often to take it  These medicines can cause stomach bleeding if not taken correctly  Ibuprofen can cause kidney damage  Do not take ibuprofen if you have kidney disease, an ulcer, or allergies to aspirin  Acetaminophen can cause liver damage  Do not drink alcohol if you take acetaminophen  · Ear drops  help treat your ear pain  · Antibiotics  help treat a bacterial infection that caused your ear infection  · Take your medicine as directed  Contact your healthcare provider if you think your medicine is not helping or if you have side effects  Tell him or her if you are allergic to any medicine  Keep a list of the medicines, vitamins, and herbs you take  Include the amounts, and when and why you take them  Bring the list or the pill bottles to follow-up visits  Carry your medicine list with you in case of an emergency  Heat or ice:   · Heat  may be used to decrease your pain  Place a warm, moist washcloth on your ear  Apply for 15 to 20 minutes, 3 to 4 times a day    · Ice  helps decrease swelling and pain  Use an ice pack or put crushed ice in a plastic bag  Cover the ice pack with a towel and place it on your ear for 15 to 20 minutes, 3 to 4 times a day for 2 days  Prevent otitis media:   · Wash your hands often  Use soap and water  Wash your hands after you use the bathroom, change a child's diapers, or sneeze  Wash your hands before you prepare or eat food  · Stay away from people who are ill  Some germs are easily and quickly spread through contact  Return to work or school: You may return to work or school when your fever is gone     Follow up with your healthcare provider as directed:  Write down your questions so you remember to ask them during your visits  Contact your healthcare provider if:   · Your ear pain gets worse or does not go away, even after treatment  · The outside of your ear is red or swollen  · You have vomiting or diarrhea  · You have fluid coming from your ear  · You have questions or concerns about your condition or care  Return to the emergency department if:   · You have a seizure  · You have a fever and a stiff neck  © 2017 [facility] Information is for End User's use only and may not be sold, redistributed or otherwise used for commercial purposes  All illustrations and images included in CareNotes® are the copyrighted property of A D A M [employer] Inc  or Jaime Flores  The above information is an  only  It is not intended as medical advice for individual conditions or treatments  Talk to your doctor, nurse or pharmacist before following any medical regimen to see if it is safe and effective for you

## 2020-02-20 DIAGNOSIS — F32.A DEPRESSION, CONTROLLED: ICD-10-CM

## 2020-02-21 RX ORDER — PAROXETINE HYDROCHLORIDE 20 MG/1
20 TABLET, FILM COATED ORAL DAILY
Qty: 30 TABLET | Refills: 2 | Status: SHIPPED | OUTPATIENT
Start: 2020-02-21 | End: 2020-06-23

## 2020-06-23 DIAGNOSIS — F32.A DEPRESSION, CONTROLLED: ICD-10-CM

## 2020-06-23 RX ORDER — PAROXETINE HYDROCHLORIDE 20 MG/1
TABLET, FILM COATED ORAL
Qty: 30 TABLET | Refills: 2 | Status: SHIPPED | OUTPATIENT
Start: 2020-06-23 | End: 2020-09-15

## 2020-09-14 DIAGNOSIS — F32.A DEPRESSION, CONTROLLED: ICD-10-CM

## 2020-09-15 RX ORDER — PAROXETINE HYDROCHLORIDE 20 MG/1
TABLET, FILM COATED ORAL
Qty: 30 TABLET | Refills: 2 | Status: SHIPPED | OUTPATIENT
Start: 2020-09-15 | End: 2021-01-04 | Stop reason: SDUPTHER

## 2020-10-12 ENCOUNTER — OFFICE VISIT (OUTPATIENT)
Dept: FAMILY MEDICINE CLINIC | Facility: CLINIC | Age: 55
End: 2020-10-12
Payer: COMMERCIAL

## 2020-10-12 VITALS
TEMPERATURE: 99.4 F | HEART RATE: 77 BPM | HEIGHT: 64 IN | BODY MASS INDEX: 36.37 KG/M2 | DIASTOLIC BLOOD PRESSURE: 70 MMHG | WEIGHT: 213 LBS | SYSTOLIC BLOOD PRESSURE: 100 MMHG | OXYGEN SATURATION: 97 % | RESPIRATION RATE: 18 BRPM

## 2020-10-12 DIAGNOSIS — Z01.419 ENCOUNTER FOR ANNUAL ROUTINE GYNECOLOGICAL EXAMINATION: ICD-10-CM

## 2020-10-12 DIAGNOSIS — Z13.1 SCREENING FOR DIABETES MELLITUS: ICD-10-CM

## 2020-10-12 DIAGNOSIS — Z23 ENCOUNTER FOR IMMUNIZATION: ICD-10-CM

## 2020-10-12 DIAGNOSIS — Z00.00 HEALTHCARE MAINTENANCE: ICD-10-CM

## 2020-10-12 DIAGNOSIS — Z12.39 ENCOUNTER FOR SCREENING FOR MALIGNANT NEOPLASM OF BREAST, UNSPECIFIED SCREENING MODALITY: ICD-10-CM

## 2020-10-12 DIAGNOSIS — R53.83 FATIGUE, UNSPECIFIED TYPE: ICD-10-CM

## 2020-10-12 PROCEDURE — 90715 TDAP VACCINE 7 YRS/> IM: CPT | Performed by: FAMILY MEDICINE

## 2020-10-12 PROCEDURE — 99396 PREV VISIT EST AGE 40-64: CPT | Performed by: FAMILY MEDICINE

## 2020-10-12 PROCEDURE — 90471 IMMUNIZATION ADMIN: CPT | Performed by: FAMILY MEDICINE

## 2020-10-14 ENCOUNTER — HOSPITAL ENCOUNTER (OUTPATIENT)
Dept: RADIOLOGY | Facility: HOSPITAL | Age: 55
Discharge: HOME | End: 2020-10-14
Payer: COMMERCIAL

## 2020-10-14 VITALS — HEIGHT: 64 IN | BODY MASS INDEX: 36.37 KG/M2 | WEIGHT: 213 LBS

## 2020-10-14 DIAGNOSIS — Z01.419 ENCOUNTER FOR ANNUAL ROUTINE GYNECOLOGICAL EXAMINATION: ICD-10-CM

## 2020-10-14 PROCEDURE — 77067 SCR MAMMO BI INCL CAD: CPT

## 2020-10-14 PROCEDURE — 77063 BREAST TOMOSYNTHESIS BI: CPT

## 2020-10-15 ENCOUNTER — TELEPHONE (OUTPATIENT)
Dept: FAMILY MEDICINE CLINIC | Facility: CLINIC | Age: 55
End: 2020-10-15

## 2020-10-15 LAB
ALBUMIN SERPL-MCNC: 4.6 G/DL (ref 3.8–4.9)
ALBUMIN/GLOB SERPL: 1.4 {RATIO} (ref 1.2–2.2)
ALP SERPL-CCNC: 115 IU/L (ref 39–117)
ALT SERPL-CCNC: 38 IU/L (ref 0–32)
AST SERPL-CCNC: 42 IU/L (ref 0–40)
BASOPHILS # BLD AUTO: 0 X10E3/UL (ref 0–0.2)
BASOPHILS NFR BLD AUTO: 0 %
BILIRUB SERPL-MCNC: 0.2 MG/DL (ref 0–1.2)
BUN SERPL-MCNC: 18 MG/DL (ref 6–24)
BUN/CREAT SERPL: 27 (ref 9–23)
CALCIUM SERPL-MCNC: 9.7 MG/DL (ref 8.7–10.2)
CHLORIDE SERPL-SCNC: 102 MMOL/L (ref 96–106)
CHOLEST SERPL-MCNC: 170 MG/DL (ref 100–199)
CO2 SERPL-SCNC: 26 MMOL/L (ref 20–29)
CREAT SERPL-MCNC: 0.66 MG/DL (ref 0.57–1)
EOSINOPHIL # BLD AUTO: 0.2 X10E3/UL (ref 0–0.4)
EOSINOPHIL NFR BLD AUTO: 2 %
ERYTHROCYTE [DISTWIDTH] IN BLOOD BY AUTOMATED COUNT: 13.3 % (ref 11.7–15.4)
EST. AVERAGE GLUCOSE BLD GHB EST-MCNC: 123 MG/DL
GLOBULIN SER-MCNC: 3.2 G/DL (ref 1.5–4.5)
GLUCOSE SERPL-MCNC: 90 MG/DL (ref 65–99)
HBA1C MFR BLD: 5.9 % (ref 4.8–5.6)
HCT VFR BLD AUTO: 38.6 % (ref 34–46.6)
HCV AB S/CO SERPL IA: <0.1 S/CO RATIO (ref 0–0.9)
HDLC SERPL-MCNC: 57 MG/DL
HGB BLD-MCNC: 12.6 G/DL (ref 11.1–15.9)
IMM GRANULOCYTES # BLD: 0 X10E3/UL (ref 0–0.1)
IMM GRANULOCYTES NFR BLD: 0 %
LDLC SERPL CALC-MCNC: 99 MG/DL (ref 0–99)
LYMPHOCYTES # BLD AUTO: 3 X10E3/UL (ref 0.7–3.1)
LYMPHOCYTES NFR BLD AUTO: 38 %
MCH RBC QN AUTO: 27.6 PG (ref 26.6–33)
MCHC RBC AUTO-ENTMCNC: 32.6 G/DL (ref 31.5–35.7)
MCV RBC AUTO: 85 FL (ref 79–97)
MONOCYTES # BLD AUTO: 0.5 X10E3/UL (ref 0.1–0.9)
MONOCYTES NFR BLD AUTO: 6 %
NEUTROPHILS # BLD AUTO: 4.1 X10E3/UL (ref 1.4–7)
NEUTROPHILS NFR BLD AUTO: 54 %
PLATELET # BLD AUTO: 262 X10E3/UL (ref 150–450)
POTASSIUM SERPL-SCNC: 5 MMOL/L (ref 3.5–5.2)
PROT SERPL-MCNC: 7.8 G/DL (ref 6–8.5)
RBC # BLD AUTO: 4.57 X10E6/UL (ref 3.77–5.28)
SL AMB EGFR AFRICAN AMERICAN: 116 ML/MIN/1.73
SL AMB EGFR NON AFRICAN AMERICAN: 100 ML/MIN/1.73
SODIUM SERPL-SCNC: 140 MMOL/L (ref 134–144)
TRIGL SERPL-MCNC: 73 MG/DL (ref 0–149)
TSH SERPL DL<=0.005 MIU/L-ACNC: 1.98 UIU/ML (ref 0.45–4.5)
WBC # BLD AUTO: 7.8 X10E3/UL (ref 3.4–10.8)

## 2020-10-16 ENCOUNTER — TELEPHONE (OUTPATIENT)
Dept: FAMILY MEDICINE CLINIC | Facility: CLINIC | Age: 55
End: 2020-10-16

## 2020-10-16 DIAGNOSIS — Z09 FOLLOW UP: Primary | ICD-10-CM

## 2021-01-04 DIAGNOSIS — F32.A DEPRESSION, CONTROLLED: ICD-10-CM

## 2021-01-04 RX ORDER — PAROXETINE HYDROCHLORIDE 20 MG/1
20 TABLET, FILM COATED ORAL DAILY
Qty: 30 TABLET | Refills: 2 | Status: SHIPPED | OUTPATIENT
Start: 2021-01-04 | End: 2021-05-30

## 2021-03-02 ENCOUNTER — TELEPHONE (OUTPATIENT)
Dept: FAMILY MEDICINE CLINIC | Facility: CLINIC | Age: 56
End: 2021-03-02

## 2021-03-02 NOTE — TELEPHONE ENCOUNTER
Patient came into office wanting a referral for gyno  She has an appt 3/24 @ [facility]  Patient stated one provided in October is over 7 months old and not accepted  If referral is provided, patient would like to be called at [phone] to pick it up

## 2021-03-08 DIAGNOSIS — Z01.419 WOMEN'S ANNUAL ROUTINE GYNECOLOGICAL EXAMINATION: Primary | ICD-10-CM

## 2021-03-08 NOTE — PROGRESS NOTES
Called patient to inform her that she does not need a referral for OB/GYn but the number was busy   Placed an OB/Gyn referral in case patient calls or comes to office requesting referral

## 2021-04-21 DIAGNOSIS — Z12.31 BREAST CANCER SCREENING BY MAMMOGRAM: Primary | ICD-10-CM

## 2021-04-21 DIAGNOSIS — Z12.11 COLON CANCER SCREENING: ICD-10-CM

## 2021-05-30 DIAGNOSIS — F32.A DEPRESSION, CONTROLLED: ICD-10-CM

## 2021-05-30 RX ORDER — PAROXETINE HYDROCHLORIDE 20 MG/1
TABLET, FILM COATED ORAL
Qty: 30 TABLET | Refills: 2 | Status: SHIPPED | OUTPATIENT
Start: 2021-05-30 | End: 2021-11-02 | Stop reason: SDUPTHER

## 2021-06-07 ENCOUNTER — OFFICE VISIT (OUTPATIENT)
Dept: OBGYN CLINIC | Facility: CLINIC | Age: 56
End: 2021-06-07
Payer: COMMERCIAL

## 2021-06-07 VITALS
BODY MASS INDEX: 36.43 KG/M2 | DIASTOLIC BLOOD PRESSURE: 80 MMHG | SYSTOLIC BLOOD PRESSURE: 118 MMHG | HEIGHT: 64 IN | WEIGHT: 213.4 LBS

## 2021-06-07 DIAGNOSIS — Z11.3 ENCOUNTER FOR SPECIAL SCREENING EXAMINATION FOR INFECTION WITH PREDOMINANTLY SEXUAL MODE OF TRANSMISSION: ICD-10-CM

## 2021-06-07 DIAGNOSIS — Z12.4 SCREENING FOR MALIGNANT NEOPLASM OF THE CERVIX: ICD-10-CM

## 2021-06-07 DIAGNOSIS — N92.4 ABNORMAL PERIMENOPAUSAL BLEEDING: ICD-10-CM

## 2021-06-07 DIAGNOSIS — Z11.51 SCREENING FOR HUMAN PAPILLOMAVIRUS: ICD-10-CM

## 2021-06-07 DIAGNOSIS — Z72.51 HIGH RISK HETEROSEXUAL BEHAVIOR: ICD-10-CM

## 2021-06-07 DIAGNOSIS — Z01.419 ROUTINE GYNECOLOGICAL EXAMINATION: Primary | ICD-10-CM

## 2021-06-07 PROCEDURE — 87624 HPV HI-RISK TYP POOLED RSLT: CPT | Performed by: OBSTETRICS & GYNECOLOGY

## 2021-06-07 PROCEDURE — S0610 ANNUAL GYNECOLOGICAL EXAMINA: HCPCS | Performed by: OBSTETRICS & GYNECOLOGY

## 2021-06-07 PROCEDURE — G0145 SCR C/V CYTO,THINLAYER,RESCR: HCPCS | Performed by: OBSTETRICS & GYNECOLOGY

## 2021-06-07 PROCEDURE — 87591 N.GONORRHOEAE DNA AMP PROB: CPT | Performed by: OBSTETRICS & GYNECOLOGY

## 2021-06-07 PROCEDURE — 87510 GARDNER VAG DNA DIR PROBE: CPT | Performed by: OBSTETRICS & GYNECOLOGY

## 2021-06-07 PROCEDURE — 87660 TRICHOMONAS VAGIN DIR PROBE: CPT | Performed by: OBSTETRICS & GYNECOLOGY

## 2021-06-07 PROCEDURE — 87491 CHLMYD TRACH DNA AMP PROBE: CPT | Performed by: OBSTETRICS & GYNECOLOGY

## 2021-06-07 PROCEDURE — 87480 CANDIDA DNA DIR PROBE: CPT | Performed by: OBSTETRICS & GYNECOLOGY

## 2021-06-07 NOTE — PROGRESS NOTES
Subjective      Jamshid Mccracken is a 54 y o  female who presents for annual well woman exam      patient  Started to become irregular within the last year for the last menses her menses become very heavy and lasted for 2 weeks recommend pelvic ultrasound as well as recommend patient to return to office for endometrial biopsy denies any palpitation denies any dizziness denies any fatigue also patient desire STD check    Menstrual History:  OB History        2    Para   2    Term   2            AB        Living   2       SAB        TAB        Ectopic        Multiple        Live Births               Obstetric Comments   2               No LMP recorded  The following portions of the patient's history were reviewed and updated as appropriate: allergies, current medications, past family history, past medical history, past social history, past surgical history and problem list     Review of Systems  Review of Systems   Constitutional: Negative for activity change, appetite change, chills, fatigue and fever  Respiratory: Negative for cough and shortness of breath  Cardiovascular: Negative for chest pain, palpitations and leg swelling  Gastrointestinal: Negative for abdominal pain, constipation, diarrhea, nausea and vomiting  Genitourinary: Negative for difficulty urinating, dysuria, flank pain, frequency, hematuria, urgency and vaginal discharge  Neurological: Negative for dizziness and headaches  Psychiatric/Behavioral: Negative for confusion            Objective      /80 (BP Location: Left arm, Patient Position: Sitting, Cuff Size: Extra-Large)   Ht 5' 4" (1 626 m)   Wt 96 8 kg (213 lb 6 4 oz)   BMI 36 63 kg/m²     Physical Exam  OBGyn Exam     General:   alert, appears stated age and cooperative   Heart: regular rate and rhythm, S1, S2 normal, no murmur, click, rub or gallop   Lungs: clear to auscultation bilaterally   Abdomen: soft, non-tender, without masses or organomegaly Vulva: normal   Vagina: normal mucosa, normal discharge   Cervix: no cervical motion tenderness, no lesions and Cervix very elongated   Uterus: mobile, Mildly enlarged, second-degree uterovaginal prolapse   Adnexa:  Breast Exam:  normal adnexa  breasts appear normal, no suspicious masses, no skin or nipple changes or axillary nodes  Assessment      @well woman@   70-year-old female  Annual exam   Depression stable  Prior to vaginal delivery   Perimenopausal bleeding  Desire STD check  Uterovaginal prolapse 2nd to third-degree  Plan    Pap/HPV  Diet /exercise  Calcium / vitamin-D   GC/CT / affirm   Pelvic ultrasound   Return to office for endometrial biopsy  Mammogram up-to-date   Colonoscopy up-to-date   All questions answered  There are no Patient Instructions on file for this visit

## 2021-06-08 LAB
C TRACH DNA SPEC QL NAA+PROBE: NEGATIVE
HPV HR 12 DNA CVX QL NAA+PROBE: NEGATIVE
HPV16 DNA CVX QL NAA+PROBE: NEGATIVE
HPV18 DNA CVX QL NAA+PROBE: NEGATIVE
N GONORRHOEA DNA SPEC QL NAA+PROBE: NEGATIVE

## 2021-06-09 LAB
CANDIDA RRNA VAG QL PROBE: POSITIVE
G VAGINALIS RRNA GENITAL QL PROBE: NEGATIVE
T VAGINALIS RRNA GENITAL QL PROBE: NEGATIVE

## 2021-06-10 LAB
LAB AP GYN PRIMARY INTERPRETATION: NORMAL
Lab: NORMAL

## 2021-06-11 DIAGNOSIS — B37.3 CANDIDA VAGINITIS: Primary | ICD-10-CM

## 2021-06-11 RX ORDER — CLOTRIMAZOLE 1 %
1 CREAM WITH APPLICATOR VAGINAL
Qty: 45 G | Refills: 0 | Status: SHIPPED | OUTPATIENT
Start: 2021-06-11 | End: 2021-06-18

## 2021-07-07 ENCOUNTER — PREP FOR PROCEDURE (OUTPATIENT)
Dept: GASTROENTEROLOGY | Facility: AMBULARY SURGERY CENTER | Age: 56
End: 2021-07-07

## 2021-07-07 ENCOUNTER — TELEPHONE (OUTPATIENT)
Dept: GASTROENTEROLOGY | Facility: AMBULARY SURGERY CENTER | Age: 56
End: 2021-07-07

## 2021-07-07 DIAGNOSIS — Z20.822 ENCOUNTER FOR PREPROCEDURE SCREENING LABORATORY TESTING FOR COVID-19: ICD-10-CM

## 2021-07-07 DIAGNOSIS — Z12.11 SPECIAL SCREENING FOR MALIGNANT NEOPLASMS, COLON: Primary | ICD-10-CM

## 2021-07-07 DIAGNOSIS — Z01.812 ENCOUNTER FOR PREPROCEDURE SCREENING LABORATORY TESTING FOR COVID-19: ICD-10-CM

## 2021-07-07 NOTE — TELEPHONE ENCOUNTER
Patient is scheduled for colonoscopy on October 11, 2021 at [address] [place] [address] with Herlinda Kirby MD  Patient is aware of pre-procedure prep of Miralax/Dulcolax and they will be called the day prior between 2 and 6 pm for time to report for procedure

## 2021-08-12 ENCOUNTER — HOSPITAL ENCOUNTER (OUTPATIENT)
Dept: RADIOLOGY | Facility: HOSPITAL | Age: 56
Discharge: HOME | End: 2021-08-12
Attending: OBSTETRICS & GYNECOLOGY
Payer: COMMERCIAL

## 2021-08-12 DIAGNOSIS — N92.4 ABNORMAL PERIMENOPAUSAL BLEEDING: ICD-10-CM

## 2021-08-12 PROCEDURE — 76830 TRANSVAGINAL US NON-OB: CPT

## 2021-08-12 PROCEDURE — 76856 US EXAM PELVIC COMPLETE: CPT

## 2021-08-30 ENCOUNTER — PROCEDURE VISIT (OUTPATIENT)
Dept: OBGYN CLINIC | Facility: CLINIC | Age: 56
End: 2021-08-30
Payer: COMMERCIAL

## 2021-08-30 VITALS
DIASTOLIC BLOOD PRESSURE: 80 MMHG | WEIGHT: 210 LBS | SYSTOLIC BLOOD PRESSURE: 138 MMHG | BODY MASS INDEX: 35.85 KG/M2 | HEIGHT: 64 IN

## 2021-08-30 DIAGNOSIS — N92.4 ABNORMAL PERIMENOPAUSAL BLEEDING: Primary | ICD-10-CM

## 2021-08-30 PROCEDURE — 58100 BIOPSY OF UTERUS LINING: CPT | Performed by: OBSTETRICS & GYNECOLOGY

## 2021-08-30 PROCEDURE — 88305 TISSUE EXAM BY PATHOLOGIST: CPT | Performed by: PATHOLOGY

## 2021-08-31 NOTE — PROGRESS NOTES
Assessment/Plan:     Diagnoses and all orders for this visit:    Abnormal perimenopausal bleeding  -     Tissue Exam      59-year-old female   Complete uterovaginal prolapse on today's exam worse than prior visit   perimenopausal bleeding  Plan  Endometrial biopsy   Pelvic ultrasound   Return to office for pessary trial    Subjective:      Patient ID: Ana M Link is a 54 y o  female  HPI  59-year-old female presents to the office today for endometrial biopsy secondary to perimenopausal bleeding, elevated endometrial thickness  Endometrial biopsy    Date/Time: 8/30/2021 8:28 PM  Performed by: Ethel Zavala MD  Authorized by: Ethel Zavala MD   Universal Protocol:  Consent: Verbal consent obtained  Risks and benefits: risks, benefits and alternatives were discussed  Consent given by: patient  Time out: Immediately prior to procedure a "time out" was called to verify the correct patient, procedure, equipment, support staff and site/side marked as required  Patient understanding: patient states understanding of the procedure being performed  Patient consent: the patient's understanding of the procedure matches consent given  Procedure consent: procedure consent matches procedure scheduled  Relevant documents: relevant documents present and verified  Radiology Images displayed and confirmed  If images not available, report reviewed: imaging studies available  Patient identity confirmed: verbally with patient      Indication:     Indications:  Other disorder of menstruation and other abnormal bleeding from female genital tract    Procedure:     Procedure: endometrial biopsy with Pipelle      A bivalve speculum was placed in the vagina: yes      Cervix cleaned and prepped: yes      A paracervical block was performed: no      An intracervical block was performed: no      The cervix was dilated: no      Uterus sounded: yes      Uterus sound depth (cm):  10    Specimen collected: specimen collected and sent to pathology Comments:     Procedure comments:  Complete uterovaginal prolapse noted cervix was noted at the level of the os endometrial biopsy performed without needing to use speculum Management option for prolapse uterus pessary to return to office for pessary trial          The following portions of the patient's history were reviewed and updated as appropriate: allergies, current medications, past family history, past medical history, past social history, past surgical history and problem list     Review of Systems      Objective:      /80 (BP Location: Left arm, Patient Position: Sitting, Cuff Size: Adult)   Ht 5' 4" (1 626 m)   Wt 95 3 kg (210 lb)   BMI 36 05 kg/m²          Physical Exam

## 2021-09-15 ENCOUNTER — OFFICE VISIT (OUTPATIENT)
Dept: OBGYN CLINIC | Facility: CLINIC | Age: 56
End: 2021-09-15
Payer: COMMERCIAL

## 2021-09-15 VITALS
BODY MASS INDEX: 36.33 KG/M2 | WEIGHT: 212.8 LBS | SYSTOLIC BLOOD PRESSURE: 124 MMHG | HEIGHT: 64 IN | DIASTOLIC BLOOD PRESSURE: 82 MMHG

## 2021-09-15 DIAGNOSIS — N81.4 UTEROVAGINAL PROLAPSE: Primary | ICD-10-CM

## 2021-09-15 PROCEDURE — 3008F BODY MASS INDEX DOCD: CPT | Performed by: OBSTETRICS & GYNECOLOGY

## 2021-09-15 PROCEDURE — 99213 OFFICE O/P EST LOW 20 MIN: CPT | Performed by: OBSTETRICS & GYNECOLOGY

## 2021-09-15 PROCEDURE — 1036F TOBACCO NON-USER: CPT | Performed by: OBSTETRICS & GYNECOLOGY

## 2021-09-16 NOTE — PROGRESS NOTES
Assessment/Plan:     Diagnoses and all orders for this visit:    Uterovaginal prolapse        41-year-old female    uterovaginal prolapse third/fourth degree   perimenopausal bleeding  Fibroid utetrus  Plan  Endometrial biopsy  benign  Pelvic ultrasound results discussed with patient  Management option for vaginal prolapse explained and discussed with patient in details patient desire expectant management    Subjective:      Patient ID: Jenna Rankin is a 54 y o  female  HPI  41-year-old female presents to the office today to discuss results of biopsy and ultrasound was done at the time of her prior visit as well as to discuss management option for her uterovaginal prolapse  Endometrial biopsy results discussed with patient negative for hyperplasia or carcinoma results discussed reassurance given, patient instructed to return to office if she has any abnormal vaginal bleeding    Ultrasound results and finding of 2 fibroid uterus discussed with patient as patient currently asymptomatic expectant management recommended    Management option for uterovaginal prolapse discussed with patient option given for conservative management expectant management, trial of pessary, surgical management, as patient is currently asymptomatic desire expectant management Kegel exercise and pelvic floor therapy recommended plan explained and discussed with patient all questions answered and patient was satisfied      The following portions of the patient's history were reviewed and updated as appropriate: allergies, current medications, past family history, past medical history, past social history, past surgical history and problem list     Review of Systems      Objective:      /82 (BP Location: Left arm, Patient Position: Sitting, Cuff Size: Extra-Large)   Ht 5' 4" (1 626 m)   Wt 96 5 kg (212 lb 12 8 oz)   LMP  (LMP Unknown)   BMI 36 53 kg/m²          Physical Exam  Constitutional:       Appearance: She is well-developed  Abdominal:      General: There is no distension  Palpations: Abdomen is soft  Tenderness: There is no abdominal tenderness  Genitourinary:     Labia:         Right: No rash, tenderness or lesion  Left: No rash, tenderness or lesion  Vagina: No signs of injury  No vaginal discharge, erythema or tenderness  Cervix: No cervical motion tenderness, discharge or friability  Uterus: With uterine prolapse  Adnexa:         Right: No mass, tenderness or fullness  Left: No mass, tenderness or fullness  Comments: UV prolapse third degree  Neurological:      Mental Status: She is alert and oriented to person, place, and time     Psychiatric:         Behavior: Behavior normal

## 2021-10-04 ENCOUNTER — TELEPHONE (OUTPATIENT)
Dept: PREADMISSION TESTING | Facility: HOSPITAL | Age: 56
End: 2021-10-04

## 2021-10-05 ENCOUNTER — TELEPHONE (OUTPATIENT)
Dept: GASTROENTEROLOGY | Facility: CLINIC | Age: 56
End: 2021-10-05

## 2021-10-07 VITALS — HEIGHT: 64 IN | BODY MASS INDEX: 36.19 KG/M2 | WEIGHT: 212 LBS

## 2021-10-08 ENCOUNTER — TELEPHONE (OUTPATIENT)
Dept: GASTROENTEROLOGY | Facility: AMBULARY SURGERY CENTER | Age: 56
End: 2021-10-08

## 2021-10-11 ENCOUNTER — HOSPITAL ENCOUNTER (OUTPATIENT)
Dept: GASTROENTEROLOGY | Facility: AMBULARY SURGERY CENTER | Age: 56
Setting detail: OUTPATIENT SURGERY
Discharge: HOME | End: 2021-10-11
Attending: INTERNAL MEDICINE | Admitting: INTERNAL MEDICINE
Payer: COMMERCIAL

## 2021-10-11 ENCOUNTER — ANESTHESIA EVENT (OUTPATIENT)
Dept: GASTROENTEROLOGY | Facility: AMBULARY SURGERY CENTER | Age: 56
End: 2021-10-11

## 2021-10-11 ENCOUNTER — ANESTHESIA (OUTPATIENT)
Dept: GASTROENTEROLOGY | Facility: AMBULARY SURGERY CENTER | Age: 56
End: 2021-10-11

## 2021-10-11 VITALS
SYSTOLIC BLOOD PRESSURE: 130 MMHG | RESPIRATION RATE: 18 BRPM | HEART RATE: 78 BPM | DIASTOLIC BLOOD PRESSURE: 86 MMHG | OXYGEN SATURATION: 100 % | TEMPERATURE: 96.2 F

## 2021-10-11 DIAGNOSIS — Z12.11 SPECIAL SCREENING FOR MALIGNANT NEOPLASMS, COLON: ICD-10-CM

## 2021-10-11 PROBLEM — F32.A DEPRESSION: Status: ACTIVE | Noted: 2021-10-11

## 2021-10-11 LAB
EXT PREGNANCY TEST URINE: NEGATIVE
EXT. CONTROL: NORMAL

## 2021-10-11 PROCEDURE — 45380 COLONOSCOPY AND BIOPSY: CPT | Performed by: INTERNAL MEDICINE

## 2021-10-11 PROCEDURE — 45385 COLONOSCOPY W/LESION REMOVAL: CPT | Performed by: INTERNAL MEDICINE

## 2021-10-11 PROCEDURE — 88305 TISSUE EXAM BY PATHOLOGIST: CPT | Performed by: PATHOLOGY

## 2021-10-11 PROCEDURE — 81025 URINE PREGNANCY TEST: CPT | Performed by: ANESTHESIOLOGY

## 2021-10-11 RX ORDER — PROPOFOL 10 MG/ML
INJECTION, EMULSION INTRAVENOUS AS NEEDED
Status: DISCONTINUED | OUTPATIENT
Start: 2021-10-11 | End: 2021-10-11

## 2021-10-11 RX ORDER — SODIUM CHLORIDE, SODIUM LACTATE, POTASSIUM CHLORIDE, CALCIUM CHLORIDE 600; 310; 30; 20 MG/100ML; MG/100ML; MG/100ML; MG/100ML
INJECTION, SOLUTION INTRAVENOUS CONTINUOUS PRN
Status: DISCONTINUED | OUTPATIENT
Start: 2021-10-11 | End: 2021-10-11

## 2021-10-11 RX ORDER — LIDOCAINE HYDROCHLORIDE 10 MG/ML
INJECTION, SOLUTION EPIDURAL; INFILTRATION; INTRACAUDAL; PERINEURAL AS NEEDED
Status: DISCONTINUED | OUTPATIENT
Start: 2021-10-11 | End: 2021-10-11

## 2021-10-11 RX ORDER — SODIUM CHLORIDE, SODIUM LACTATE, POTASSIUM CHLORIDE, CALCIUM CHLORIDE 600; 310; 30; 20 MG/100ML; MG/100ML; MG/100ML; MG/100ML
125 INJECTION, SOLUTION INTRAVENOUS CONTINUOUS
Status: DISCONTINUED | OUTPATIENT
Start: 2021-10-11 | End: 2021-10-15 | Stop reason: HOSPADM

## 2021-10-11 RX ORDER — PROPOFOL 10 MG/ML
INJECTION, EMULSION INTRAVENOUS CONTINUOUS PRN
Status: DISCONTINUED | OUTPATIENT
Start: 2021-10-11 | End: 2021-10-11

## 2021-10-11 RX ADMIN — PROPOFOL 140 MCG/KG/MIN: 10 INJECTION, EMULSION INTRAVENOUS at 08:37

## 2021-10-11 RX ADMIN — PROPOFOL 150 MG: 10 INJECTION, EMULSION INTRAVENOUS at 08:37

## 2021-10-11 RX ADMIN — LIDOCAINE HYDROCHLORIDE 50 MG: 10 INJECTION, SOLUTION EPIDURAL; INFILTRATION; INTRACAUDAL; PERINEURAL at 08:37

## 2021-10-11 RX ADMIN — SODIUM CHLORIDE, SODIUM LACTATE, POTASSIUM CHLORIDE, AND CALCIUM CHLORIDE: .6; .31; .03; .02 INJECTION, SOLUTION INTRAVENOUS at 08:34

## 2021-10-18 ENCOUNTER — HOSPITAL ENCOUNTER (OUTPATIENT)
Dept: RADIOLOGY | Facility: HOSPITAL | Age: 56
Discharge: HOME | End: 2021-10-18
Attending: OBSTETRICS & GYNECOLOGY
Payer: COMMERCIAL

## 2021-10-18 VITALS — WEIGHT: 212 LBS | BODY MASS INDEX: 36.19 KG/M2 | HEIGHT: 64 IN

## 2021-10-18 DIAGNOSIS — Z12.31 BREAST CANCER SCREENING BY MAMMOGRAM: ICD-10-CM

## 2021-10-18 PROCEDURE — 77067 SCR MAMMO BI INCL CAD: CPT

## 2021-10-18 PROCEDURE — 77063 BREAST TOMOSYNTHESIS BI: CPT

## 2021-10-20 ENCOUNTER — TELEPHONE (OUTPATIENT)
Dept: GASTROENTEROLOGY | Facility: CLINIC | Age: 56
End: 2021-10-20

## 2021-11-02 DIAGNOSIS — F32.A DEPRESSION, CONTROLLED: ICD-10-CM

## 2021-11-04 RX ORDER — PAROXETINE HYDROCHLORIDE 20 MG/1
20 TABLET, FILM COATED ORAL DAILY
Qty: 30 TABLET | Refills: 2 | Status: SHIPPED | OUTPATIENT
Start: 2021-11-04 | End: 2022-04-11 | Stop reason: SDUPTHER

## 2022-02-07 ENCOUNTER — TELEPHONE (OUTPATIENT)
Dept: OBGYN CLINIC | Facility: CLINIC | Age: 57
End: 2022-02-07

## 2022-02-07 NOTE — TELEPHONE ENCOUNTER
Pt requesting a letter excusing her from lifting and pushing heavy objects at work due to her prolapse

## 2022-02-07 NOTE — TELEPHONE ENCOUNTER
You can give patient note to avoid heavy lifting and repetitive lifting movement she is okay with pushing and pulling

## 2022-02-08 NOTE — TELEPHONE ENCOUNTER
Dr Claudette Rodriguez what is the lifting limit, her job is going to want to know how much she can lift

## 2022-03-17 DIAGNOSIS — F32.A DEPRESSION, CONTROLLED: ICD-10-CM

## 2022-03-18 RX ORDER — PAROXETINE HYDROCHLORIDE 20 MG/1
20 TABLET, FILM COATED ORAL DAILY
Qty: 30 TABLET | Refills: 2 | OUTPATIENT
Start: 2022-03-18

## 2022-03-28 DIAGNOSIS — F32.A DEPRESSION, CONTROLLED: ICD-10-CM

## 2022-03-28 RX ORDER — PAROXETINE HYDROCHLORIDE 20 MG/1
TABLET, FILM COATED ORAL
Qty: 30 TABLET | Refills: 2 | OUTPATIENT
Start: 2022-03-28

## 2022-04-11 ENCOUNTER — OFFICE VISIT (OUTPATIENT)
Dept: FAMILY MEDICINE CLINIC | Facility: CLINIC | Age: 57
End: 2022-04-11
Payer: COMMERCIAL

## 2022-04-11 VITALS
DIASTOLIC BLOOD PRESSURE: 72 MMHG | HEART RATE: 91 BPM | BODY MASS INDEX: 36.54 KG/M2 | RESPIRATION RATE: 18 BRPM | HEIGHT: 64 IN | WEIGHT: 214 LBS | TEMPERATURE: 98.9 F | SYSTOLIC BLOOD PRESSURE: 130 MMHG | OXYGEN SATURATION: 98 %

## 2022-04-11 DIAGNOSIS — Z11.4 ENCOUNTER FOR SCREENING FOR HIV: ICD-10-CM

## 2022-04-11 DIAGNOSIS — F32.A DEPRESSION, CONTROLLED: ICD-10-CM

## 2022-04-11 DIAGNOSIS — Z00.00 ANNUAL PHYSICAL EXAM: Primary | ICD-10-CM

## 2022-04-11 DIAGNOSIS — R53.83 OTHER FATIGUE: ICD-10-CM

## 2022-04-11 PROCEDURE — 99396 PREV VISIT EST AGE 40-64: CPT | Performed by: FAMILY MEDICINE

## 2022-04-11 PROCEDURE — 1036F TOBACCO NON-USER: CPT | Performed by: FAMILY MEDICINE

## 2022-04-11 PROCEDURE — 3008F BODY MASS INDEX DOCD: CPT | Performed by: FAMILY MEDICINE

## 2022-04-11 RX ORDER — PAROXETINE HYDROCHLORIDE 20 MG/1
20 TABLET, FILM COATED ORAL DAILY
Qty: 90 TABLET | Refills: 2 | Status: SHIPPED | OUTPATIENT
Start: 2022-04-11

## 2022-04-11 NOTE — PATIENT INSTRUCTIONS
For menopausal hot flashes, use Black Cohosh over the counter  Please take multivitamins or Vitamin D + Calcium for the bones  Apply 2 drops of Debrox ear drop to your left ear  Wellness Visit for Adults   AMBULATORY CARE:   A wellness visit  is when you see your healthcare provider to get screened for health problems  Your healthcare provider will also give you advice on how to stay healthy  Write down your questions so you remember to ask them  Ask your healthcare provider how often you should have a wellness visit  What happens at a wellness visit:  Your healthcare provider will ask about your health, and your family history of health problems  This includes high blood pressure, heart disease, and cancer  He or she will ask if you have symptoms that concern you, if you smoke, and about your mood  You may also be asked about your intake of medicines, supplements, food, and alcohol  Any of the following may be done:  · Your weight  will be checked  Your height may also be checked so your body mass index (BMI) can be calculated  Your BMI shows if you are at a healthy weight  · Your blood pressure  and heart rate will be checked  Your temperature may also be checked  · Blood and urine tests  may be done  Blood tests may be done to check your cholesterol levels  Abnormal cholesterol levels increase your risk for heart disease and stroke  You may also need a blood or urine test to check for diabetes if you are at increased risk  Urine tests may be done to look for signs of an infection or kidney disease  · A physical exam  includes checking your heartbeat and lungs with a stethoscope  Your healthcare provider may also check your skin to look for sun damage  · Screening tests  may be recommended  A screening test is done to check for diseases that may not cause symptoms  The screening tests you may need depend on your age, gender, family history, and lifestyle habits   For example, colorectal screening may be recommended if you are 48years old or older  Screening tests you need if you are a woman:   · A Pap smear  is used to screen for cervical cancer  Pap smears are usually done every 3 to 5 years depending on your age  You may need them more often if you have had abnormal Pap smear test results in the past  Ask your healthcare provider how often you should have a Pap smear  · A mammogram  is an x-ray of your breasts to screen for breast cancer  Experts recommend mammograms every 2 years starting at age 48 years  You may need a mammogram at age 52 years or younger if you have an increased risk for breast cancer  Talk to your healthcare provider about when you should start having mammograms and how often you need them  Vaccines you may need:   · Get an influenza vaccine  every year  The influenza vaccine protects you from the flu  Several types of viruses cause the flu  The viruses change over time, so new vaccines are made each year  · Get a tetanus-diphtheria (Td) booster vaccine  every 10 years  This vaccine protects you against tetanus and diphtheria  Tetanus is a severe infection that may cause painful muscle spasms and lockjaw  Diphtheria is a severe bacterial infection that causes a thick covering in the back of your mouth and throat  · Get a human papillomavirus (HPV) vaccine  if you are female and aged 23 to 32 or male 23 to 24 and never received it  This vaccine protects you from HPV infection  HPV is the most common infection spread by sexual contact  HPV may also cause vaginal, penile, and anal cancers  · Get a pneumococcal vaccine  if you are aged 72 years or older  The pneumococcal vaccine is an injection given to protect you from pneumococcal disease  Pneumococcal disease is an infection caused by pneumococcal bacteria  The infection may cause pneumonia, meningitis, or an ear infection      · Get a shingles vaccine  if you are 60 or older, even if you have had shingles before  The shingles vaccine is an injection to protect you from the varicella-zoster virus  This is the same virus that causes chickenpox  Shingles is a painful rash that develops in people who had chickenpox or have been exposed to the virus  How to eat healthy:  My Plate is a model for planning healthy meals  It shows the types and amounts of foods that should go on your plate  Fruits and vegetables make up about half of your plate, and grains and protein make up the other half  A serving of dairy is included on the side of your plate  The amount of calories and serving sizes you need depends on your age, gender, weight, and height  Examples of healthy foods are listed below:  · Eat a variety of vegetables  such as dark green, red, and orange vegetables  You can also include canned vegetables low in sodium (salt) and frozen vegetables without added butter or sauces  · Eat a variety of fresh fruits , canned fruit in 100% juice, frozen fruit, and dried fruit  · Include whole grains  At least half of the grains you eat should be whole grains  Examples include whole-wheat bread, wheat pasta, brown rice, and whole-grain cereals such as oatmeal     · Eat a variety of protein foods such as seafood (fish and shellfish), lean meat, and poultry without skin (turkey and chicken)  Examples of lean meats include pork leg, shoulder, or tenderloin, and beef round, sirloin, tenderloin, and extra lean ground beef  Other protein foods include eggs and egg substitutes, beans, peas, soy products, nuts, and seeds  · Choose low-fat dairy products such as skim or 1% milk or low-fat yogurt, cheese, and cottage cheese  · Limit unhealthy fats  such as butter, hard margarine, and shortening  Exercise:  Exercise at least 30 minutes per day on most days of the week  Some examples of exercise include walking, biking, dancing, and swimming   You can also fit in more physical activity by taking the stairs instead of the elevator or parking farther away from stores  Include muscle strengthening activities 2 days each week  Regular exercise provides many health benefits  It helps you manage your weight, and decreases your risk for type 2 diabetes, heart disease, stroke, and high blood pressure  Exercise can also help improve your mood  Ask your healthcare provider about the best exercise plan for you  General health and safety guidelines:   · Do not smoke  Nicotine and other chemicals in cigarettes and cigars can cause lung damage  Ask your healthcare provider for information if you currently smoke and need help to quit  E-cigarettes or smokeless tobacco still contain nicotine  Talk to your healthcare provider before you use these products  · Limit alcohol  A drink of alcohol is 12 ounces of beer, 5 ounces of wine, or 1½ ounces of liquor  · Lose weight, if needed  Being overweight increases your risk of certain health conditions  These include heart disease, high blood pressure, type 2 diabetes, and certain types of cancer  · Protect your skin  Do not sunbathe or use tanning beds  Use sunscreen with a SPF 15 or higher  Apply sunscreen at least 15 minutes before you go outside  Reapply sunscreen every 2 hours  Wear protective clothing, hats, and sunglasses when you are outside  · Drive safely  Always wear your seatbelt  Make sure everyone in your car wears a seatbelt  A seatbelt can save your life if you are in an accident  Do not use your cell phone when you are driving  This could distract you and cause an accident  Pull over if you need to make a call or send a text message  · Practice safe sex  Use latex condoms if are sexually active and have more than one partner  Your healthcare provider may recommend screening tests for sexually transmitted infections (STIs)  · Wear helmets, lifejackets, and protective gear    Always wear a helmet when you ride a bike or motorcycle, go skiing, or play sports that could cause a head injury  Wear protective equipment when you play sports  Wear a lifejacket when you are on a boat or doing water sports  © Copyright [employer] 2022 Information is for End User's use only and may not be sold, redistributed or otherwise used for commercial purposes  All illustrations and images included in CareNotes® are the copyrighted property of A D A M , Inc  or [facility] Anais Cotton   The above information is an  only  It is not intended as medical advice for individual conditions or treatments  Talk to your doctor, nurse or pharmacist before following any medical regimen to see if it is safe and effective for you

## 2022-04-11 NOTE — PROGRESS NOTES
[address] [facility]    NAME: Victor M Whitlock  AGE: 64 y o  SEX: female  : 1965     DATE: 2022     Assessment and Plan:     Jose Miguel Al was seen today for annual exam, patient states she had 3 covid vaccines and flu shot, ear problem and feels stressed  Diagnoses and all orders for this visit:    Annual physical exam  -     CBC and differential; Future  -     Comprehensive metabolic panel; Future  -     Lipid panel; Future  -     Vitamin D 25 hydroxy; Future  -     Hemoglobin A1C; Future  -     TSH, 3rd generation with Free T4 reflex; Future    Depression, controlled  -     PARoxetine (PAXIL) 20 mg tablet; Take 1 tablet (20 mg total) by mouth daily    Other fatigue  -     Vitamin D 25 hydroxy; Future  -     TSH, 3rd generation with Free T4 reflex; Future  -      Encouraged taking vitamin D and calcium regularly    Encounter for screening for HIV  -     HIV 1/2 Antigen/Antibody (4th Generation) w Reflex [facility]; Future       Immunizations and preventive care screenings were discussed with patient today  Appropriate education was printed on patient's after visit summary  Counseling:  Alcohol/drug use: discussed moderation in alcohol intake, the recommendations for healthy alcohol use, and avoidance of illicit drug use  Dental Health: discussed importance of regular tooth brushing, flossing, and dental visits  Injury prevention: discussed safety/seat belts, safety helmets, smoke detectors, carbon dioxide detectors, and smoking near bedding or upholstery  Sexual health: discussed sexually transmitted diseases, partner selection, use of condoms, avoidance of unintended pregnancy, and contraceptive alternatives  · Exercise: the importance of regular exercise/physical activity was discussed  Recommend exercise 3-5 times per week for at least 30 minutes  BMI Counseling: Body mass index is 36 73 kg/m²   The BMI is above normal  Nutrition recommendations include decreasing portion sizes, encouraging healthy choices of fruits and vegetables, decreasing fast food intake, consuming healthier snacks, limiting drinks that contain sugar, moderation in carbohydrate intake, increasing intake of lean protein, reducing intake of saturated and trans fat and reducing intake of cholesterol  Exercise recommendations include moderate physical activity 150 minutes/week and exercising 3-5 times per week  No pharmacotherapy was ordered  Rationale for BMI follow-up plan is due to patient being overweight or obese  Return in about 9 weeks (around 6/13/2022) for f/u mood and BMI  Chief Complaint:     Chief Complaint   Patient presents with    Annual Exam    patient states she had 3 covid vaccines and flu shot    Ear Problem     left ear clogged    feels stressed     reports irritability      History of Present Illness:     Adult Annual Physical   Patient here for a comprehensive physical exam  The patient reports problems - depression and anxiety  She stated that she has been on Paxil for many years  She has not been seen at [facility] in the past 2 yrs to follow up for depression/anxiety  She reports that Paxil helps stabilize her mood  She has been stressed lately by her work and relationship  She reports not having restful sleep, but has normal appetite  She regularly goes to [employer] every [employer] and feels that she has people supportive of her  At this point, she declined therapy since she has enough support through the [employer]  Patient asked about obtaining a medical mariajuana card  She states that it could help her relaxed and relieve stress  I let her know that [facility] does not offer this service  Diet and Physical Activity  · Diet/Nutrition: well balanced diet, limited junk food and consuming 3-5 servings of fruits/vegetables daily  · Exercise: no formal exercise        Depression Screening  PHQ-2/9 Depression Screening         General Health  · Sleep: sleeps poorly and gets 4-6 hours of sleep on average  · Hearing: normal - bilateral   · Vision: no vision problems and most recent eye exam >1 year ago  · Dental: regular dental visits and brushes teeth twice daily  /GYN Health  · Patient is: postmenopausal  She sometimes has hot flashes  · Last menstrual period: about 2 yrs ago  · Contraceptive method: No      Review of Systems:     Review of Systems   Constitutional: Negative for chills and fever  HENT: Negative for ear pain and sore throat  Eyes: Negative for pain and visual disturbance  Respiratory: Negative for cough and shortness of breath  Cardiovascular: Negative for chest pain and palpitations  Gastrointestinal: Negative for abdominal pain, constipation, diarrhea and vomiting  Genitourinary: Negative for dysuria and hematuria  Musculoskeletal: Positive for back pain  Negative for arthralgias  Skin: Negative for color change and rash  Neurological: Positive for headaches  Negative for seizures and syncope  All other systems reviewed and are negative       Past Medical History:     Past Medical History:   Diagnosis Date    Closed fracture of left lateral malleolus     last assessed - 27Jan2014    Depression       Past Surgical History:     Past Surgical History:   Procedure Laterality Date    COLONOSCOPY      WISDOM TOOTH EXTRACTION        Social History:     Social History     Socioeconomic History    Marital status: Single     Spouse name: None    Number of children: None    Years of education: None    Highest education level: None   Occupational History    None   Tobacco Use    Smoking status: Never Smoker    Smokeless tobacco: Never Used   Vaping Use    Vaping Use: Never used   Substance and Sexual Activity    Alcohol use: No     Comment: socially    Drug use: Yes     Types: Marijuana     Comment: hx marijuana use-quit 1990    Sexual activity: Yes     Partners: Male     Birth control/protection: None, Post-menopausal   Other Topics Concern    None   Social History Narrative    None     Social Determinants of Health     Financial Resource Strain: Not on file   Food Insecurity: Not on file   Transportation Needs: Not on file   Physical Activity: Not on file   Stress: Not on file   Social Connections: Not on file   Intimate Partner Violence: Not on file   Housing Stability: Not on file      Family History:     Family History   Problem Relation Age of Onset    Arthritis Family     Hypertension Mother     Heart attack Father     No Known Problems Sister     No Known Problems Daughter     No Known Problems Daughter     No Known Problems Maternal Aunt     No Known Problems Maternal Aunt     No Known Problems Paternal Aunt       Current Medications:     Current Outpatient Medications   Medication Sig Dispense Refill    PARoxetine (PAXIL) 20 mg tablet Take 1 tablet (20 mg total) by mouth daily 90 tablet 2     No current facility-administered medications for this visit  Allergies: Allergies   Allergen Reactions    Other Allergic Rhinitis     Seasonal, pollen      Physical Exam:     /72 (BP Location: Left arm, Patient Position: Sitting, Cuff Size: Adult)   Pulse 91   Temp 98 9 °F (37 2 °C) (Tympanic)   Resp 18   Ht 5' 4" (1 626 m)   Wt 97 1 kg (214 lb)   LMP  (LMP Unknown)   SpO2 98%   BMI 36 73 kg/m²     Physical Exam  Vitals and nursing note reviewed  Constitutional:       General: She is not in acute distress  Appearance: She is well-developed  HENT:      Head: Normocephalic and atraumatic  Right Ear: Tympanic membrane, ear canal and external ear normal       Left Ear: Tympanic membrane and external ear normal       Ears:      Comments: Partially blocked cerumen in left canal     Nose: Nose normal       Mouth/Throat:      Mouth: Mucous membranes are moist    Eyes:      Extraocular Movements: Extraocular movements intact        Conjunctiva/sclera: Conjunctivae normal    Cardiovascular:      Rate and Rhythm: Normal rate and regular rhythm  Pulses: Normal pulses  Heart sounds: Normal heart sounds  No murmur heard  Pulmonary:      Effort: Pulmonary effort is normal  No respiratory distress  Breath sounds: Normal breath sounds  No wheezing  Abdominal:      General: Bowel sounds are normal       Palpations: Abdomen is soft  Tenderness: There is no abdominal tenderness  Musculoskeletal:      Cervical back: Normal range of motion and neck supple  Skin:     General: Skin is warm and dry  Neurological:      General: No focal deficit present  Mental Status: She is alert  Motor: No weakness  Psychiatric:         Behavior: Behavior normal       Comments: She has been stressed out lately by her work and relationship          [address] S[address]ra  [address]

## 2022-04-13 ENCOUNTER — TELEPHONE (OUTPATIENT)
Dept: FAMILY MEDICINE CLINIC | Facility: CLINIC | Age: 57
End: 2022-04-13

## 2022-04-13 NOTE — TELEPHONE ENCOUNTER
Patient would like a letter to give to her job stating that she is depressed and what was discussed on her appointment with the doctor on 4/11/22

## 2022-05-03 NOTE — TELEPHONE ENCOUNTER
Pt stated she would like the letter similar to the letter that was written on 1/29/2018 that she got to excuse her from jury duty  The pt was very aggressive in her demands  She does not need this for her job and she needs it ASAP      Please call [phone]

## 2022-05-05 NOTE — TELEPHONE ENCOUNTER
Pt stated she needed the letter for herself  I told her that her diagnosis can not be added to the letter and she said that she doesn't care who knows about her diagnosis  She said she doesn't need accommodations at work, she has no problem doing her job

## 2022-06-24 ENCOUNTER — TELEPHONE (OUTPATIENT)
Dept: FAMILY MEDICINE CLINIC | Facility: CLINIC | Age: 57
End: 2022-06-24

## 2022-09-01 ENCOUNTER — HOSPITAL ENCOUNTER (EMERGENCY)
Facility: HOSPITAL | Age: 57
Discharge: HOME | End: 2022-09-01
Attending: EMERGENCY MEDICINE
Payer: OTHER MISCELLANEOUS

## 2022-09-01 VITALS
WEIGHT: 211.4 LBS | RESPIRATION RATE: 20 BRPM | HEART RATE: 95 BPM | OXYGEN SATURATION: 100 % | BODY MASS INDEX: 36.29 KG/M2 | SYSTOLIC BLOOD PRESSURE: 135 MMHG | TEMPERATURE: 98.1 F | DIASTOLIC BLOOD PRESSURE: 87 MMHG

## 2022-09-01 DIAGNOSIS — S60.419A ABRASION OF FINGER, INITIAL ENCOUNTER: Primary | ICD-10-CM

## 2022-09-01 LAB — ALT SERPL W P-5'-P-CCNC: 63 U/L (ref 12–78)

## 2022-09-01 PROCEDURE — 86706 HEP B SURFACE ANTIBODY: CPT | Performed by: PHYSICIAN ASSISTANT

## 2022-09-01 PROCEDURE — 87340 HEPATITIS B SURFACE AG IA: CPT | Performed by: PHYSICIAN ASSISTANT

## 2022-09-01 PROCEDURE — 99283 EMERGENCY DEPT VISIT LOW MDM: CPT

## 2022-09-01 PROCEDURE — 99282 EMERGENCY DEPT VISIT SF MDM: CPT | Performed by: PHYSICIAN ASSISTANT

## 2022-09-01 PROCEDURE — 86803 HEPATITIS C AB TEST: CPT | Performed by: PHYSICIAN ASSISTANT

## 2022-09-01 PROCEDURE — 87389 HIV-1 AG W/HIV-1&-2 AB AG IA: CPT | Performed by: PHYSICIAN ASSISTANT

## 2022-09-01 PROCEDURE — 36415 COLL VENOUS BLD VENIPUNCTURE: CPT | Performed by: PHYSICIAN ASSISTANT

## 2022-09-01 PROCEDURE — 84460 ALANINE AMINO (ALT) (SGPT): CPT | Performed by: PHYSICIAN ASSISTANT

## 2022-09-01 RX ORDER — GINSENG 100 MG
1 CAPSULE ORAL ONCE
Status: COMPLETED | OUTPATIENT
Start: 2022-09-01 | End: 2022-09-01

## 2022-09-01 RX ADMIN — BACITRACIN 1 SMALL APPLICATION: 500 OINTMENT TOPICAL at 14:43

## 2022-09-01 NOTE — DISCHARGE INSTRUCTIONS
Clean wound with soap water twice daily then apply topical antibiotic ointment and new Band-Aid      Follow-up with your work related medical provider next available day      We have sent baseline exposure panel blood work here    Return to the ED for signs of wound infection

## 2022-09-01 NOTE — ED PROVIDER NOTES
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 in the trash " poked into my finger "  Left third finger with bandaid  Sent by work  Patient is a 29-year-old black female with history of depression who reports she works as a  at [employer]  [employer]  Reports she was throwing out  a bag of garbage when something sharp in the bag cut her left 3rd finger  Injury occurred approximately noontime today  She cleaned with alcohol  She reports no left 3rd finger numbness, tingling, weakness  Last tetanus was 2020  She was referred to the ED by her employer  No other complaints  Pt reports the trash bag was not from sharp container           Prior to Admission Medications   Prescriptions Last Dose Informant Patient Reported? Taking? PARoxetine (PAXIL) 20 mg tablet   No No   Sig: Take 1 tablet (20 mg total) by mouth daily      Facility-Administered Medications: None       Past Medical History:   Diagnosis Date    Closed fracture of left lateral malleolus     last assessed - [id number]    Depression        Past Surgical History:   Procedure Laterality Date    COLONOSCOPY      WISDOM TOOTH EXTRACTION         Family History   Problem Relation Age of Onset    Arthritis Family     Hypertension Mother     Heart attack Father     No Known Problems Sister     No Known Problems Daughter     No Known Problems Daughter     No Known Problems Maternal Aunt     No Known Problems Maternal Aunt     No Known Problems Paternal Aunt      I have reviewed and agree with the history as documented      E-Cigarette/Vaping    E-Cigarette Use Never User      E-Cigarette/Vaping Substances    Nicotine No     THC No     CBD No     Flavoring No     Other No      Social History     Tobacco Use    Smoking status: Never Smoker    Smokeless tobacco: Never Used   Vaping Use    Vaping Use: Never used   Substance Use Topics    Alcohol use: No     Comment: socially    Drug use: Yes     Types: Marijuana     Comment: hx marijuana use-quit 1990       Review of Systems   Constitutional: Negative for chills and fever  HENT: Negative for ear pain and sore throat  Respiratory: Negative for cough and shortness of breath  Cardiovascular: Negative for chest pain and palpitations  Gastrointestinal: Negative for abdominal pain and vomiting  Genitourinary: Negative for dysuria and hematuria  Musculoskeletal: Negative for arthralgias and back pain  Skin: Positive for wound  Neurological: Negative for syncope  All other systems reviewed and are negative  Physical Exam  Physical Exam  Vitals and nursing note reviewed  Constitutional:       General: She is not in acute distress  Appearance: Normal appearance  She is not ill-appearing, toxic-appearing or diaphoretic  HENT:      Head: Normocephalic and atraumatic  Cardiovascular:      Rate and Rhythm: Regular rhythm  Pulses: Normal pulses  Heart sounds: Normal heart sounds  Pulmonary:      Effort: Pulmonary effort is normal       Breath sounds: Normal breath sounds  Musculoskeletal:      Comments: Small superficial wound proximal flexor L 3rd finger which is neurovascularly intact    Skin:     General: Skin is warm and dry  Capillary Refill: Capillary refill takes less than 2 seconds  Neurological:      Mental Status: She is alert           Vital Signs  ED Triage Vitals [09/01/22 1404]   Temperature Pulse Respirations Blood Pressure SpO2   98 1 °F (36 7 °C) 95 20 135/87 100 %      Temp Source Heart Rate Source Patient Position - Orthostatic VS BP Location FiO2 (%)   Tympanic Monitor Sitting Left arm --      Pain Score       --           Vitals:    09/01/22 1404   BP: 135/87   Pulse: 95   Patient Position - Orthostatic VS: Sitting         Visual Acuity      ED Medications  Medications   bacitracin topical ointment 1 small application (1 small application Topical Given 9/1/22 1443)       Diagnostic Studies  Results Reviewed     Procedure Component Value Units Date/Time    ALT [[id number]]  (Normal) Collected: 09/01/22 1441    Lab Status: Final result Specimen: Blood from Arm, Right Updated: 09/01/22 1500     ALT 63 U/L     HIV 1/2 Antigen/Antibody (4th Generation) w Reflex [facility] [[id number]] Collected: 09/01/22 1441    Lab Status: In process Specimen: Blood from Arm, Right Updated: 09/01/22 1444    Hepatitis B surface antigen [[id number]] Collected: 09/01/22 1441    Lab Status: In process Specimen: Blood from Arm, Right Updated: 09/01/22 1444    Hepatitis C antibody [[id number]] Collected: 09/01/22 1441    Lab Status: In process Specimen: Blood from Arm, Right Updated: 09/01/22 1444    Hepatitis B surface antibody [[id number]] Collected: 09/01/22 1441    Lab Status: In process Specimen: Blood from Arm, Right Updated: 09/01/22 1444                 No orders to display              Procedures  Procedures         ED Course  ED Course as of 09/01/22 1500   Thu Sep 01, 2022   1428 Wound was cleansed thoroughly with soap and water  Bacitracin ointment Band-Aid applied  Will send baseline exposure panel  Discussion with patient regarding risks / benefits of post exposure prophylaxis for HIV  Given very low risk of transmission, patient chose no post exposure prophylaxis at this time  Will follow up with her work related medical provider  MDM  Number of Diagnoses or Management Options  Abrasion of finger, initial encounter: new and requires workup  Diagnosis management comments: 63 yo black female with L 3rd finger wound  Cut on "something sharp" in trash bag she was throwing out at work as   Baseline exposure lab work sent  Follow up with work related medical provider next available day  Low risk for transmission of HIV/hepatitis   Return precautions given        Amount and/or Complexity of Data Reviewed  Clinical lab tests: ordered  Tests in the medicine section of CPT®: ordered  Decide to obtain previous medical records or to obtain history from someone other than the patient: yes  Review and summarize past medical records: yes  Independent visualization of images, tracings, or specimens: yes    Risk of Complications, Morbidity, and/or Mortality  Presenting problems: low  Diagnostic procedures: low  Management options: low    Patient Progress  Patient progress: stable      Disposition  Final diagnoses:   Abrasion of finger, initial encounter     Time reflects when diagnosis was documented in both MDM as applicable and the Disposition within this note     Time User Action Codes Description Comment    9/1/2022  2:30 PM Contiliano, [place] Add [S60 419A] Abrasion of finger, initial encounter       ED Disposition     ED Disposition   Discharge    Condition   Stable    Date/Time   Thu Sep 1, 2022  2:30 PM    Chintan Calhoun discharge to home/self care  Follow-up Information    None         Discharge Medication List as of 9/1/2022  2:31 PM      CONTINUE these medications which have NOT CHANGED    Details   PARoxetine (PAXIL) 20 mg tablet Take 1 tablet (20 mg total) by mouth daily, Starting Mon 4/11/2022, Normal             No discharge procedures on file      PDMP Review     None          ED Provider  Electronically Signed by           Myriam Choi PA-C  09/01/22 4845

## 2022-09-02 DIAGNOSIS — F32.A DEPRESSION, CONTROLLED: ICD-10-CM

## 2022-09-02 LAB
HBV SURFACE AB SER-ACNC: 10.02 MIU/ML
HBV SURFACE AG SER QL: NORMAL
HCV AB SER QL: NORMAL
HIV 1+2 AB+HIV1 P24 AG SERPL QL IA: NORMAL

## 2022-09-02 RX ORDER — PAROXETINE HYDROCHLORIDE 20 MG/1
20 TABLET, FILM COATED ORAL DAILY
Qty: 90 TABLET | Refills: 2 | Status: SHIPPED | OUTPATIENT
Start: 2022-09-02

## 2022-09-28 ENCOUNTER — ANNUAL EXAM (OUTPATIENT)
Dept: OBGYN CLINIC | Facility: CLINIC | Age: 57
End: 2022-09-28
Payer: COMMERCIAL

## 2022-09-28 VITALS
WEIGHT: 212 LBS | DIASTOLIC BLOOD PRESSURE: 74 MMHG | HEIGHT: 64 IN | BODY MASS INDEX: 36.19 KG/M2 | SYSTOLIC BLOOD PRESSURE: 128 MMHG

## 2022-09-28 DIAGNOSIS — N81.4 UTEROVAGINAL PROLAPSE: ICD-10-CM

## 2022-09-28 DIAGNOSIS — Z12.31 SCREENING MAMMOGRAM, ENCOUNTER FOR: ICD-10-CM

## 2022-09-28 DIAGNOSIS — Z01.419 WOMEN'S ANNUAL ROUTINE GYNECOLOGICAL EXAMINATION: Primary | ICD-10-CM

## 2022-09-28 PROCEDURE — 99396 PREV VISIT EST AGE 40-64: CPT | Performed by: OBSTETRICS & GYNECOLOGY

## 2022-09-28 NOTE — PROGRESS NOTES
Subjective      Nick Haynes is a 64 y o  female who presents for annual well woman exam    No PMB   Desires to try pessary for UV prolpase    Menstrual History:  OB History        2    Para   2    Term   2            AB        Living   2       SAB        IAB        Ectopic        Multiple        Live Births   2           Obstetric Comments   2               No LMP recorded (lmp unknown)  Patient is postmenopausal        The following portions of the patient's history were reviewed and updated as appropriate: allergies, current medications, past family history, past medical history, past social history, past surgical history and problem list     Review of Systems  Review of Systems   Constitutional: Negative for activity change, appetite change, chills, fatigue and fever  Respiratory: Negative for cough and shortness of breath  Cardiovascular: Negative for chest pain, palpitations and leg swelling  Gastrointestinal: Negative for abdominal pain, constipation, diarrhea, nausea and vomiting  Genitourinary: Negative for difficulty urinating, dysuria, flank pain, frequency, hematuria, urgency and vaginal discharge  Neurological: Negative for dizziness and headaches  Psychiatric/Behavioral: Negative for confusion            Objective      /74 (BP Location: Left arm, Patient Position: Sitting, Cuff Size: Adult)   Ht 5' 4" (1 626 m)   Wt 96 2 kg (212 lb)   LMP  (LMP Unknown)   BMI 36 39 kg/m²     Physical Exam  OBGyn Exam     General:   alert and oriented, in no acute distress, alert, appears stated age and cooperative   Heart: regular rate and rhythm, S1, S2 normal, no murmur, click, rub or gallop   Lungs: clear to auscultation bilaterally   Abdomen: soft, non-tender, without masses or organomegaly   Vulva: normal   Vagina: normal mucosa, normal discharge   Cervix: no cervical motion tenderness and no lesions   Uterus: normal size complete uterovaginal prolapse noted stage III cervix at the level of the hymen   Adnexa:  Breast Exam:  normal adnexa  breasts appear normal, no suspicious masses, no skin or nipple changes or axillary nodes  Patient has stage III uterovaginal prolapse desire to try pessary ring with support 4  Was inserted patient taught how to removed cleaned and reinserted the pessary return to office in 2 weeks for follow-up on pessary          Assessment      @well woman@   49-year-old female    uterovaginal prolapse third/fourth degree   Had episodes of perimenopausal bleeding benign endometrial biopsy  Fibroid utetrus  Pap/hpv neg 2021  Plan  Pap smear-20 21   Diet/exercise  Calcium/vitamin-D   Trial of vaginal pessary ring with support#4 inserted to return to office in 2 weeks follow-up on pessary     All questions answered  There are no Patient Instructions on file for this visit

## 2022-10-10 ENCOUNTER — OFFICE VISIT (OUTPATIENT)
Dept: OBGYN CLINIC | Facility: CLINIC | Age: 57
End: 2022-10-10
Payer: COMMERCIAL

## 2022-10-10 VITALS
BODY MASS INDEX: 36.88 KG/M2 | SYSTOLIC BLOOD PRESSURE: 120 MMHG | WEIGHT: 216 LBS | HEIGHT: 64 IN | DIASTOLIC BLOOD PRESSURE: 78 MMHG

## 2022-10-10 DIAGNOSIS — Z46.89 PESSARY MAINTENANCE: Primary | ICD-10-CM

## 2022-10-10 PROCEDURE — 99212 OFFICE O/P EST SF 10 MIN: CPT | Performed by: OBSTETRICS & GYNECOLOGY

## 2022-10-10 NOTE — PROGRESS NOTES
Assessment/Plan:     Diagnoses and all orders for this visit:    Pessary maintenance       72-year-old female    uterovaginal prolapse third/fourth degree   Had episodes of perimenopausal bleeding benign endometrial biopsy  Fibroid utetrus  Pap/hpv neg 2021  Pessary ring with support 4  Plan  Pap smear-20 21   Diet/exercise  Calcium/vitamin-D   Return to office in 3-4 months follow-up on pessary   continue doing Kegel exercise  Subjective:      Patient ID: Linda Méndez is a 64 y o  female  HPI  Patient seen evaluated presents to the office today for follow-up on pessary patient has ring pessary inserted secondary to complete uterovaginal prolapse patient denies any problem with the pessary denies any vaginal bleeding   Patient denies any urgency frequency or dysuria, denies any pelvic pain patient is able to remove pessary cleaned and reinserted patient does not want to proceed with any surgical management for now satisfied with the pessary and desire to continue  The following portions of the patient's history were reviewed and updated as appropriate: allergies, current medications, past family history, past medical history, past social history, past surgical history and problem list     Review of Systems      Objective:      /78 (BP Location: Left arm, Patient Position: Sitting, Cuff Size: Adult)   Ht 5' 4" (1 626 m)   Wt 98 kg (216 lb)   LMP  (LMP Unknown)   BMI 37 08 kg/m²          Physical Exam  Constitutional:       Appearance: Normal appearance  Abdominal:      General: Abdomen is flat  Bowel sounds are normal       Palpations: Abdomen is soft  Genitourinary:     Labia:         Right: No rash, tenderness, lesion or injury  Left: No rash, tenderness, lesion or injury  Neurological:      General: No focal deficit present  Mental Status: She is alert and oriented to person, place, and time     Psychiatric:         Mood and Affect: Mood normal          Behavior: Behavior normal  patient was able to remove pessary and insert w/o any dificulty   Pessary in the appropriate position  Return to office in 3 months for follow-up

## 2022-10-25 ENCOUNTER — OFFICE VISIT (OUTPATIENT)
Dept: FAMILY MEDICINE CLINIC | Facility: CLINIC | Age: 57
End: 2022-10-25

## 2022-10-25 VITALS
HEIGHT: 64 IN | SYSTOLIC BLOOD PRESSURE: 112 MMHG | OXYGEN SATURATION: 99 % | BODY MASS INDEX: 36.9 KG/M2 | WEIGHT: 216.13 LBS | DIASTOLIC BLOOD PRESSURE: 64 MMHG | RESPIRATION RATE: 21 BRPM | TEMPERATURE: 100.8 F | HEART RATE: 97 BPM

## 2022-10-25 DIAGNOSIS — M72.2 PLANTAR FASCIITIS: Primary | ICD-10-CM

## 2022-10-25 NOTE — PATIENT INSTRUCTIONS
- Plantar fascitis brace for better support   - Freeze water bottle and roll it under foot to stretch heel and help reduce inflammation   - NSAIDs (Advil, ibuprofen, Aleve) for pain

## 2022-10-25 NOTE — PROGRESS NOTES
[facility] Office visit    Assessment/Plan:     1  Plantar fasciitis  Assessment & Plan:  Left foot pain, mainly in heel  Worst in the morning after getting out of bed and after standing up from a seated position  Pain decreases with movement  Pt has hx of pes planus (flat foot) which can put added stress on the plantar fascia    · Use a plantar fascitis brace for better support   · Freeze water bottle and roll it under foot to stretch heel and help reduce inflammation   · Advised on exercises to stretch the plantar fascia/Achilles tendon and to strengthen lower leg muscles  · Recommend night splints  Can help keep plantar fascia and Achilles tendon in a lengthened position overnight to promote stretching while sleeping   · NSAIDs (Advil, ibuprofen, Aleve) for pain   · Podiatry referral provided if these measures do not help     Orders:  -     Ambulatory Referral to Podiatry; Future        Subjective: Ankle Pain   The pain is present in the left ankle and left heel  The quality of the pain is described as shooting  The pain is at a severity of 10/10  The pain is severe  The pain has been constant since onset  Pertinent negatives include no loss of sensation, numbness or tingling  She reports no foreign bodies present  The symptoms are aggravated by weight bearing and movement  She has tried acetaminophen, elevation, ice, rest and immobilization for the symptoms  The treatment provided mild relief  Rose Lopez is a 64 y o  female who presents for left heel and ankle pain  Pt states she has a history of fractured/broken ankle on the left many years ago, at least 5-6 years  She also reports she saw an Orthopedist in the past who told her she has arthritis of the left ankle, but pt is not currently taking any medication for arthritis  Per patient, the pain is worst in the morning after getting out bed and after getting up from a chair   Pt is currently wearing a soft brace from the [employer] which helps with the pain  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  Pt rotates her sneakers for work and uses sole inserts to help with comfort  Pt states Tylenol does not help with the pain  Review of Systems   Musculoskeletal: Positive for gait problem (difficulty walking when first getting out of bed or up from a seated position)  Neurological: Negative for tingling and numbness  All other systems reviewed and are negative  Objective:     /64 (BP Location: Right arm, Patient Position: Sitting, Cuff Size: Large)   Pulse 97   Temp (!) 100 8 °F (38 2 °C) (Tympanic)   Resp 21   Ht 5' 4" (1 626 m)   Wt 98 kg (216 lb 2 oz)   LMP  (LMP Unknown)   SpO2 99%   BMI 37 10 kg/m²      Physical Exam  Constitutional:       Appearance: She is obese  HENT:      Head: Normocephalic and atraumatic  Eyes:      General: No scleral icterus  Conjunctiva/sclera: Conjunctivae normal    Cardiovascular:      Rate and Rhythm: Normal rate and regular rhythm  Pulses: Normal pulses  Dorsalis pedis pulses are 2+ on the right side and 2+ on the left side  Posterior tibial pulses are 2+ on the right side and 2+ on the left side  Heart sounds: Normal heart sounds  No murmur heard  Pulmonary:      Effort: Pulmonary effort is normal  No respiratory distress  Breath sounds: Normal breath sounds  Abdominal:      General: Bowel sounds are normal       Palpations: Abdomen is soft  Tenderness: There is no abdominal tenderness  Musculoskeletal:      Cervical back: Normal range of motion  Right lower leg: No edema  Left lower leg: No edema  Left foot: Tenderness (heel) present  Skin:     General: Skin is warm  Capillary Refill: Capillary refill takes less than 2 seconds  Neurological:      Mental Status: She is alert and oriented to person, place, and time     Psychiatric:         Mood and Affect: Mood normal          Behavior: Behavior normal  ** Please Note: This note has been constructed using a voice recognition system **     Nikita James  10/26/22  4:31 PM

## 2022-10-26 PROBLEM — M72.2 PLANTAR FASCIITIS: Status: ACTIVE | Noted: 2022-10-26

## 2022-10-26 NOTE — ASSESSMENT & PLAN NOTE
Left foot pain, mainly in heel  Worst in the morning after getting out of bed and after standing up from a seated position  Pain decreases with movement  Pt has hx of pes planus (flat foot) which can put added stress on the plantar fascia    · Use a plantar fascitis brace for better support   · Freeze water bottle and roll it under foot to stretch heel and help reduce inflammation   · Advised on exercises to stretch the plantar fascia/Achilles tendon and to strengthen lower leg muscles  · Recommend night splints   Can help keep plantar fascia and Achilles tendon in a lengthened position overnight to promote stretching while sleeping   · NSAIDs (Advil, ibuprofen, Aleve) for pain   · Podiatry referral provided if these measures do not help

## 2023-01-03 ENCOUNTER — OFFICE VISIT (OUTPATIENT)
Dept: PODIATRY | Facility: CLINIC | Age: 58
End: 2023-01-03

## 2023-01-03 VITALS — RESPIRATION RATE: 17 BRPM | BODY MASS INDEX: 36.88 KG/M2 | WEIGHT: 216 LBS | HEIGHT: 64 IN

## 2023-01-03 DIAGNOSIS — M21.962 ACQUIRED DEFORMITY OF LEFT FOOT: ICD-10-CM

## 2023-01-03 DIAGNOSIS — B07.0 PLANTAR WARTS: ICD-10-CM

## 2023-01-03 DIAGNOSIS — M72.2 PLANTAR FASCIITIS: Primary | ICD-10-CM

## 2023-01-03 DIAGNOSIS — M21.41 ACQUIRED FLAT FOOT, RIGHT: ICD-10-CM

## 2023-01-03 DIAGNOSIS — M21.961 ACQUIRED DEFORMITY OF RIGHT FOOT: ICD-10-CM

## 2023-01-03 DIAGNOSIS — M21.42 ACQUIRED FLAT FOOT, LEFT: ICD-10-CM

## 2023-01-03 RX ORDER — MELOXICAM 15 MG/1
15 TABLET ORAL DAILY
Qty: 30 TABLET | Refills: 0 | Status: SHIPPED | OUTPATIENT
Start: 2023-01-03 | End: 2023-02-02

## 2023-01-03 RX ORDER — IMIQUIMOD 12.5 MG/.25G
1 CREAM TOPICAL 3 TIMES WEEKLY
Qty: 12 EACH | Refills: 0 | Status: SHIPPED | OUTPATIENT
Start: 2023-01-04

## 2023-01-03 NOTE — PROGRESS NOTES
Assessment/Plan: Acquired deformity foot bilateral   Acquired pes planus  Planter fasciitis  Atypical verruca  Pain  Plan  Foot exam performed  X-rays taken and reviewed  Patient advised on condition  She will be started on Mobic  She declines injection therapy  Plantar skin lesions debrided  Aldara cream prescribed  Patient would benefit from pronation control  Her feet of been casted for custom molded foot orthotics  Diagnoses and all orders for this visit:    Plantar fasciitis  -     meloxicam (MOBIC) 15 mg tablet; Take 1 tablet (15 mg total) by mouth daily    Acquired flat foot, right    Acquired flat foot, left    Acquired deformity of right foot    Acquired deformity of left foot    Plantar warts  -     imiquimod (ALDARA) 5 % cream; Apply 1 packet topically 3 (three) times a week          Subjective: Patient has pain  She has pain in her heel upon rising  Left greater than right  She also has painful skin lesions  Allergies   Allergen Reactions   • Other Allergic Rhinitis     Seasonal, pollen         Current Outpatient Medications:   •  [START ON 1/4/2023] imiquimod (ALDARA) 5 % cream, Apply 1 packet topically 3 (three) times a week, Disp: 12 each, Rfl: 0  •  meloxicam (MOBIC) 15 mg tablet, Take 1 tablet (15 mg total) by mouth daily, Disp: 30 tablet, Rfl: 0  •  PARoxetine (PAXIL) 20 mg tablet, Take 1 tablet (20 mg total) by mouth daily, Disp: 90 tablet, Rfl: 2    Patient Active Problem List   Diagnosis   • Depression   • Anxiety   • Acquired pes planus of left foot   • Plantar fasciitis          Patient ID: Talon Carnes is a 62 y o  female  HPI    The following portions of the patient's history were reviewed and updated as appropriate:     family history includes Arthritis in her family; Heart attack in her father; Hypertension in her mother; No Known Problems in her daughter, daughter, maternal aunt, maternal aunt, paternal aunt, and sister        reports that she has never smoked  She has never used smokeless tobacco  She reports current drug use  Drug: Marijuana  She reports that she does not drink alcohol  Vitals:    01/03/23 1705   Resp: 17       Review of Systems      Objective:  Patient's shoes and socks removed  Foot Exam    General  General Appearance: appears stated age and healthy   Orientation: alert and oriented to person, place, and time   Affect: appropriate   Gait: antalgic       Right Foot/Ankle     Inspection and Palpation  Tenderness: calcaneus tenderness   Swelling: plantar fascia   Arch: pes planus  Hammertoes: fifth toe  Hallux valgus: yes  Skin Exam: callus, dry skin and warts; Neurovascular  Dorsalis pedis: 3+  Posterior tibial: 3+      Left Foot/Ankle      Inspection and Palpation  Tenderness: bony tenderness, plantar fascia and calcaneus tenderness   Swelling: plantar fascia   Arch: pes planus  Hammertoes: fifth toe  Hallux valgus: yes  Skin Exam: callus, dry skin and warts; Neurovascular  Dorsalis pedis: 3+  Posterior tibial: 3+        Physical Exam  Vitals and nursing note reviewed  Constitutional:       Appearance: Normal appearance  Cardiovascular:      Rate and Rhythm: Normal rate and regular rhythm  Pulses:           Dorsalis pedis pulses are 3+ on the right side and 3+ on the left side  Posterior tibial pulses are 3+ on the right side and 3+ on the left side  Musculoskeletal:      Right foot: Bunion present  Left foot: Bunion and bony tenderness present  Feet:      Right foot:      Skin integrity: Callus and dry skin present  Left foot:      Skin integrity: Callus and dry skin present  Comments: Patient is pronated in stance and gait  She has hallux valgus deformity bilateral   Pain with palpation plantar fashion insertion left greater than right  X-ray demonstrates large plantar calcaneal spur left greater than right  Skin:     Capillary Refill: Capillary refill takes less than 2 seconds        Comments: Patient has xerosis of skin  She has multiple plantar skin lesions consistent with atypical verruca   Neurological:      Mental Status: She is alert  Psychiatric:         Mood and Affect: Mood normal          Behavior: Behavior normal          Thought Content:  Thought content normal          Judgment: Judgment normal

## 2023-02-02 DIAGNOSIS — M72.2 PLANTAR FASCIITIS: ICD-10-CM

## 2023-02-02 RX ORDER — MELOXICAM 15 MG/1
15 TABLET ORAL DAILY
Qty: 30 TABLET | Refills: 0 | Status: SHIPPED | OUTPATIENT
Start: 2023-02-02 | End: 2023-03-04

## 2023-03-29 ENCOUNTER — HOSPITAL ENCOUNTER (OUTPATIENT)
Dept: RADIOLOGY | Facility: HOSPITAL | Age: 58
Discharge: HOME | End: 2023-03-29
Attending: OBSTETRICS & GYNECOLOGY

## 2023-03-29 VITALS — WEIGHT: 216 LBS | HEIGHT: 64 IN | BODY MASS INDEX: 36.88 KG/M2

## 2023-03-29 DIAGNOSIS — Z12.31 SCREENING MAMMOGRAM, ENCOUNTER FOR: ICD-10-CM

## 2023-05-25 ENCOUNTER — HOSPITAL ENCOUNTER (EMERGENCY)
Facility: HOSPITAL | Age: 58
Discharge: HOME | End: 2023-05-25
Attending: EMERGENCY MEDICINE

## 2023-05-25 ENCOUNTER — APPOINTMENT (EMERGENCY)
Dept: RADIOLOGY | Facility: HOSPITAL | Age: 58
End: 2023-05-25

## 2023-05-25 VITALS
BODY MASS INDEX: 35.68 KG/M2 | RESPIRATION RATE: 18 BRPM | DIASTOLIC BLOOD PRESSURE: 79 MMHG | SYSTOLIC BLOOD PRESSURE: 134 MMHG | TEMPERATURE: 100 F | HEIGHT: 64 IN | HEART RATE: 85 BPM | OXYGEN SATURATION: 97 % | WEIGHT: 209 LBS

## 2023-05-25 DIAGNOSIS — K52.9 COLITIS: Primary | ICD-10-CM

## 2023-05-25 DIAGNOSIS — R10.9 ABDOMINAL PAIN: ICD-10-CM

## 2023-05-25 DIAGNOSIS — K57.90 DIVERTICULOSIS: ICD-10-CM

## 2023-05-25 LAB
ALBUMIN SERPL BCP-MCNC: 4.1 G/DL (ref 3.5–5)
ALP SERPL-CCNC: 140 U/L (ref 34–104)
ALT SERPL W P-5'-P-CCNC: 83 U/L (ref 7–52)
ANION GAP SERPL CALCULATED.3IONS-SCNC: 9 MMOL/L (ref 4–13)
AST SERPL W P-5'-P-CCNC: 66 U/L (ref 13–39)
BACTERIA UR QL AUTO: ABNORMAL /HPF
BASOPHILS # BLD AUTO: 0.02 THOUSANDS/ÂΜL (ref 0–0.1)
BASOPHILS NFR BLD AUTO: 0 % (ref 0–1)
BILIRUB SERPL-MCNC: 0.55 MG/DL (ref 0.2–1)
BILIRUB UR QL STRIP: ABNORMAL
BUN SERPL-MCNC: 11 MG/DL (ref 5–25)
CALCIUM SERPL-MCNC: 9.1 MG/DL (ref 8.4–10.2)
CHLORIDE SERPL-SCNC: 104 MMOL/L (ref 96–108)
CLARITY UR: ABNORMAL
CO2 SERPL-SCNC: 25 MMOL/L (ref 21–32)
COLOR UR: YELLOW
CREAT SERPL-MCNC: 0.77 MG/DL (ref 0.6–1.3)
EOSINOPHIL # BLD AUTO: 0 THOUSAND/ÂΜL (ref 0–0.61)
EOSINOPHIL NFR BLD AUTO: 0 % (ref 0–6)
ERYTHROCYTE [DISTWIDTH] IN BLOOD BY AUTOMATED COUNT: 14.9 % (ref 11.6–15.1)
GFR SERPL CREATININE-BSD FRML MDRD: 85 ML/MIN/1.73SQ M
GLUCOSE SERPL-MCNC: 96 MG/DL (ref 65–140)
GLUCOSE UR STRIP-MCNC: NEGATIVE MG/DL
HCT VFR BLD AUTO: 41.7 % (ref 34.8–46.1)
HGB BLD-MCNC: 13.3 G/DL (ref 11.5–15.4)
HGB UR QL STRIP.AUTO: ABNORMAL
HYALINE CASTS #/AREA URNS LPF: ABNORMAL /LPF
IMM GRANULOCYTES # BLD AUTO: 0.04 THOUSAND/UL (ref 0–0.2)
IMM GRANULOCYTES NFR BLD AUTO: 0 % (ref 0–2)
KETONES UR STRIP-MCNC: ABNORMAL MG/DL
LACTATE SERPL-SCNC: 0.9 MMOL/L (ref 0.5–2)
LEUKOCYTE ESTERASE UR QL STRIP: NEGATIVE
LIPASE SERPL-CCNC: <6 U/L (ref 11–82)
LYMPHOCYTES # BLD AUTO: 2.07 THOUSANDS/ÂΜL (ref 0.6–4.47)
LYMPHOCYTES NFR BLD AUTO: 20 % (ref 14–44)
MCH RBC QN AUTO: 27.5 PG (ref 26.8–34.3)
MCHC RBC AUTO-ENTMCNC: 31.9 G/DL (ref 31.4–37.4)
MCV RBC AUTO: 86 FL (ref 82–98)
MONOCYTES # BLD AUTO: 0.45 THOUSAND/ÂΜL (ref 0.17–1.22)
MONOCYTES NFR BLD AUTO: 4 % (ref 4–12)
MUCOUS THREADS UR QL AUTO: ABNORMAL
NEUTROPHILS # BLD AUTO: 7.84 THOUSANDS/ÂΜL (ref 1.85–7.62)
NEUTS SEG NFR BLD AUTO: 76 % (ref 43–75)
NITRITE UR QL STRIP: NEGATIVE
NON-SQ EPI CELLS URNS QL MICRO: ABNORMAL /HPF
NRBC BLD AUTO-RTO: 0 /100 WBCS
PH UR STRIP.AUTO: 6 [PH]
PLATELET # BLD AUTO: 218 THOUSANDS/UL (ref 149–390)
PMV BLD AUTO: 11.6 FL (ref 8.9–12.7)
POTASSIUM SERPL-SCNC: 3.7 MMOL/L (ref 3.5–5.3)
PROT SERPL-MCNC: 8.2 G/DL (ref 6.4–8.4)
PROT UR STRIP-MCNC: ABNORMAL MG/DL
RBC # BLD AUTO: 4.84 MILLION/UL (ref 3.81–5.12)
RBC #/AREA URNS AUTO: ABNORMAL /HPF
SODIUM SERPL-SCNC: 138 MMOL/L (ref 135–147)
SP GR UR STRIP.AUTO: >=1.03 (ref 1–1.03)
URINE COMMENT: ABNORMAL
URINE COMMENT: ABNORMAL
UROBILINOGEN UR QL STRIP.AUTO: 0.2 E.U./DL
WBC # BLD AUTO: 10.42 THOUSAND/UL (ref 4.31–10.16)
WBC #/AREA URNS AUTO: ABNORMAL /HPF

## 2023-05-25 RX ORDER — ONDANSETRON 4 MG/1
4 TABLET, ORALLY DISINTEGRATING ORAL EVERY 8 HOURS PRN
Qty: 10 TABLET | Refills: 0 | Status: SHIPPED | OUTPATIENT
Start: 2023-05-25

## 2023-05-25 RX ORDER — LEVOFLOXACIN 750 MG/1
750 TABLET ORAL EVERY 24 HOURS
Qty: 9 TABLET | Refills: 0 | Status: SHIPPED | OUTPATIENT
Start: 2023-05-26 | End: 2023-06-04

## 2023-05-25 RX ORDER — METRONIDAZOLE 500 MG/1
500 TABLET ORAL ONCE
Status: COMPLETED | OUTPATIENT
Start: 2023-05-25 | End: 2023-05-25

## 2023-05-25 RX ORDER — METRONIDAZOLE 500 MG/1
500 TABLET ORAL EVERY 8 HOURS SCHEDULED
Qty: 30 TABLET | Refills: 0 | Status: SHIPPED | OUTPATIENT
Start: 2023-05-25 | End: 2023-05-25 | Stop reason: SDUPTHER

## 2023-05-25 RX ORDER — FAMOTIDINE 10 MG/ML
20 INJECTION, SOLUTION INTRAVENOUS ONCE
Status: COMPLETED | OUTPATIENT
Start: 2023-05-25 | End: 2023-05-25

## 2023-05-25 RX ORDER — ACETAMINOPHEN 325 MG/1
650 TABLET ORAL ONCE
Status: COMPLETED | OUTPATIENT
Start: 2023-05-25 | End: 2023-05-25

## 2023-05-25 RX ORDER — LEVOFLOXACIN 750 MG/1
750 TABLET ORAL EVERY 24 HOURS
Qty: 9 TABLET | Refills: 0 | Status: SHIPPED | OUTPATIENT
Start: 2023-05-26 | End: 2023-05-25 | Stop reason: SDUPTHER

## 2023-05-25 RX ORDER — ONDANSETRON 2 MG/ML
4 INJECTION INTRAMUSCULAR; INTRAVENOUS ONCE
Status: COMPLETED | OUTPATIENT
Start: 2023-05-25 | End: 2023-05-25

## 2023-05-25 RX ORDER — ONDANSETRON 4 MG/1
4 TABLET, ORALLY DISINTEGRATING ORAL EVERY 8 HOURS PRN
Qty: 10 TABLET | Refills: 0 | Status: SHIPPED | OUTPATIENT
Start: 2023-05-25 | End: 2023-05-25 | Stop reason: SDUPTHER

## 2023-05-25 RX ORDER — KETOROLAC TROMETHAMINE 30 MG/ML
15 INJECTION, SOLUTION INTRAMUSCULAR; INTRAVENOUS ONCE
Status: COMPLETED | OUTPATIENT
Start: 2023-05-25 | End: 2023-05-25

## 2023-05-25 RX ORDER — METRONIDAZOLE 500 MG/1
500 TABLET ORAL EVERY 8 HOURS SCHEDULED
Qty: 30 TABLET | Refills: 0 | Status: SHIPPED | OUTPATIENT
Start: 2023-05-25 | End: 2023-06-04

## 2023-05-25 RX ADMIN — KETOROLAC TROMETHAMINE 15 MG: 30 INJECTION, SOLUTION INTRAMUSCULAR at 13:09

## 2023-05-25 RX ADMIN — ONDANSETRON 4 MG: 2 INJECTION INTRAMUSCULAR; INTRAVENOUS at 10:56

## 2023-05-25 RX ADMIN — SODIUM CHLORIDE 1000 ML: 0.9 INJECTION, SOLUTION INTRAVENOUS at 10:48

## 2023-05-25 RX ADMIN — LEVOFLOXACIN 750 MG: 500 TABLET, FILM COATED ORAL at 13:43

## 2023-05-25 RX ADMIN — METRONIDAZOLE 500 MG: 500 TABLET ORAL at 13:43

## 2023-05-25 RX ADMIN — ACETAMINOPHEN 650 MG: 325 TABLET, FILM COATED ORAL at 10:54

## 2023-05-25 RX ADMIN — FAMOTIDINE 20 MG: 10 INJECTION, SOLUTION INTRAVENOUS at 10:59

## 2023-05-25 RX ADMIN — IOHEXOL 100 ML: 350 INJECTION, SOLUTION INTRAVENOUS at 12:33

## 2023-05-25 NOTE — DISCHARGE INSTRUCTIONS
Return to the ER for further concerns or worsening symptoms  Follow up with your primary care physician and GI in 1-2 days  You have stool cultures ordered and pending  You will be notified if the test result is abnormal  Take medication as prescribed

## 2023-05-25 NOTE — Clinical Note
Jeanette Burch was seen and treated in our emergency department on 5/25/2023  Diagnosis:     Deya Murphy  may return to work on return date  She may return on this date: 05/29/2023         If you have any questions or concerns, please don't hesitate to call        Wenrer Grant DO    ______________________________           _______________          _______________  Hospital Representative                              Date                                Time

## 2023-05-25 NOTE — ED PROVIDER NOTES
History  Chief Complaint   Patient presents with   • Abdominal Pain     Sharp abd pain with N/V/D that started Tuesday, states she is weak and hasn't eaten     Patient is a 61 yo female with no significant medical hx presents with c/o diffuse abd pain/vomiting and diarrhea  She denies sick contacts  Pt was unaware of a fever until she was evaluated in the ED  She denies recent antibiotic use or hospitalization  Differential diagnosis includes but is not limited to colitis, diverticulitis, cystitis, gastroenteritis      History provided by:  Patient   used: No        Prior to Admission Medications   Prescriptions Last Dose Informant Patient Reported? Taking? PARoxetine (PAXIL) 20 mg tablet Past Week  No Yes   Sig: Take 1 tablet (20 mg total) by mouth daily   imiquimod (ALDARA) 5 % cream Unknown  No No   Sig: Apply 1 packet topically 3 (three) times a week   meloxicam (MOBIC) 15 mg tablet   No No   Sig: Take 1 tablet (15 mg total) by mouth daily for 15 days      Facility-Administered Medications: None       Past Medical History:   Diagnosis Date   • Closed fracture of left lateral malleolus     last assessed - [id number]   • Depression        Past Surgical History:   Procedure Laterality Date   • COLONOSCOPY     • WISDOM TOOTH EXTRACTION         Family History   Problem Relation Age of Onset   • Arthritis Family    • Hypertension Mother    • Heart attack Father    • No Known Problems Sister    • No Known Problems Daughter    • No Known Problems Daughter    • No Known Problems Maternal Aunt    • No Known Problems Maternal Aunt    • No Known Problems Paternal Aunt      I have reviewed and agree with the history as documented      E-Cigarette/Vaping   • E-Cigarette Use Never User      E-Cigarette/Vaping Substances   • Nicotine No    • THC No    • CBD No    • Flavoring No    • Other No      Social History     Tobacco Use   • Smoking status: Never   • Smokeless tobacco: Never   Vaping Use   • Vaping Use: Never used   Substance Use Topics   • Alcohol use: No     Comment: socially   • Drug use: Yes     Types: Marijuana     Comment: hx marijuana use-quit 1990       Review of Systems   Constitutional: Negative for chills and fever  Respiratory: Negative for cough, chest tightness and shortness of breath  Gastrointestinal: Positive for abdominal pain, diarrhea and vomiting  Negative for blood in stool and nausea  Genitourinary: Negative for dysuria, frequency, hematuria and urgency  Musculoskeletal: Negative for back pain, neck pain and neck stiffness  All other systems reviewed and are negative  Physical Exam  Physical Exam  Vitals and nursing note reviewed  Constitutional:       General: She is not in acute distress  Appearance: She is well-developed  She is not diaphoretic  HENT:      Head: Normocephalic and atraumatic  Eyes:      Conjunctiva/sclera: Conjunctivae normal       Pupils: Pupils are equal, round, and reactive to light  Cardiovascular:      Rate and Rhythm: Normal rate and regular rhythm  Heart sounds: Normal heart sounds  No murmur heard  Pulmonary:      Effort: Pulmonary effort is normal  No respiratory distress  Breath sounds: Normal breath sounds  Abdominal:      General: Abdomen is protuberant  Bowel sounds are normal  There is no distension  Palpations: Abdomen is soft  Tenderness: There is abdominal tenderness in the suprapubic area  There is no guarding or rebound  Musculoskeletal:         General: No deformity  Normal range of motion  Cervical back: Normal range of motion and neck supple  Skin:     General: Skin is warm and dry  Capillary Refill: Capillary refill takes less than 2 seconds  Coloration: Skin is not pale  Findings: No rash  Neurological:      General: No focal deficit present  Mental Status: She is alert and oriented to person, place, and time  Cranial Nerves: No cranial nerve deficit  Psychiatric:         Behavior: Behavior normal          Vital Signs  ED Triage Vitals   Temperature Pulse Respirations Blood Pressure SpO2   05/25/23 0953 05/25/23 0953 05/25/23 0953 05/25/23 0953 05/25/23 0953   (!) 101 2 °F (38 4 °C) 99 18 113/67 98 %      Temp Source Heart Rate Source Patient Position - Orthostatic VS BP Location FiO2 (%)   05/25/23 1258 05/25/23 1100 05/25/23 1115 05/25/23 1115 --   Tympanic Monitor Lying Right arm       Pain Score       05/25/23 0953       10 - Worst Possible Pain           Vitals:    05/25/23 1145 05/25/23 1200 05/25/23 1215 05/25/23 1258   BP: 125/72 106/61 106/58 134/79   Pulse: 87 88 87 85   Patient Position - Orthostatic VS:    Lying         Visual Acuity      ED Medications  Medications   acetaminophen (TYLENOL) tablet 650 mg (650 mg Oral Given 5/25/23 1054)   sodium chloride 0 9 % bolus 1,000 mL (0 mL Intravenous Stopped 5/25/23 1143)   ondansetron (ZOFRAN) injection 4 mg (4 mg Intravenous Given 5/25/23 1056)   Famotidine (PF) (PEPCID) injection 20 mg (20 mg Intravenous Given 5/25/23 1059)   iohexol (OMNIPAQUE) 350 MG/ML injection (SINGLE-DOSE) 100 mL (100 mL Intravenous Given 5/25/23 1233)   ketorolac (TORADOL) injection 15 mg (15 mg Intravenous Given 5/25/23 1309)   levofloxacin (LEVAQUIN) tablet 750 mg (750 mg Oral Given 5/25/23 1343)   metroNIDAZOLE (FLAGYL) tablet 500 mg (500 mg Oral Given 5/25/23 1343)       Diagnostic Studies  Results Reviewed     Procedure Component Value Units Date/Time    Clostridium difficile toxin by PCR with EIA [[id number]]     Lab Status: No result Specimen: Stool from Per Rectum     Stool Enteric Bacterial Panel by PCR [[id number]]     Lab Status: No result Specimen: Stool     Urine Microscopic [[id number]]  (Abnormal) Collected: 05/25/23 1046    Lab Status: Final result Specimen: Urine, Clean Catch Updated: 05/25/23 1133     RBC, UA 2-4 /hpf      WBC, UA 1-2 /hpf      Epithelial Cells Moderate /hpf      Bacteria, UA Innumerable /hpf Hyaline Casts, UA 0-1 /lpf      MUCUS THREADS Occasional     URINE COMMENT <10mL of urine submitted  Performed on concentrated sample  Lipase [[id number]]  (Abnormal) Collected: 05/25/23 1046    Lab Status: Final result Specimen: Blood from Arm, Left Updated: 05/25/23 1125     Lipase <6 u/L     Comprehensive metabolic panel [[id number]]  (Abnormal) Collected: 05/25/23 1046    Lab Status: Final result Specimen: Blood from Arm, Left Updated: 05/25/23 1125     Sodium 138 mmol/L      Potassium 3 7 mmol/L      Chloride 104 mmol/L      CO2 25 mmol/L      ANION GAP 9 mmol/L      BUN 11 mg/dL      Creatinine 0 77 mg/dL      Glucose 96 mg/dL      Calcium 9 1 mg/dL      AST 66 U/L      ALT 83 U/L      Alkaline Phosphatase 140 U/L      Total Protein 8 2 g/dL      Albumin 4 1 g/dL      Total Bilirubin 0 55 mg/dL      eGFR 85 ml/min/1 73sq m     Narrative:      [place] guidelines for Chronic Kidney Disease (CKD):   •  Stage 1 with normal or high GFR (GFR > 90 mL/min/1 73 square meters)  •  Stage 2 Mild CKD (GFR = 60-89 mL/min/1 73 square meters)  •  Stage 3A Moderate CKD (GFR = 45-59 mL/min/1 73 square meters)  •  Stage 3B Moderate CKD (GFR = 30-44 mL/min/1 73 square meters)  •  Stage 4 Severe CKD (GFR = 15-29 mL/min/1 73 square meters)  •  Stage 5 End Stage CKD (GFR <15 mL/min/1 73 square meters)  Note: GFR calculation is accurate only with a steady state creatinine    Lactic acid, plasma (w/reflex if result > 2 0) [[id number]]  (Normal) Collected: 05/25/23 1046    Lab Status: Final result Specimen: Blood from Arm, Left Updated: 05/25/23 1125     LACTIC ACID 0 9 mmol/L     Narrative:      Result may be elevated if tourniquet was used during collection      UA w Reflex to Microscopic w Reflex to Culture [[id number]]  (Abnormal) Collected: 05/25/23 1046    Lab Status: Final result Specimen: Urine, Clean Catch Updated: 05/25/23 1116     Color, UA Yellow     Clarity, UA Cloudy     Specific Gravity, UA >=1 030     pH, UA 6 0     Leukocytes, UA Negative     Nitrite, UA Negative     Protein,  (2+) mg/dl      Glucose, UA Negative mg/dl      Ketones, UA 15 (1+) mg/dl      Urobilinogen, UA 0 2 E U /dl      Bilirubin, UA Moderate     Occult Blood, UA Large     URINE COMMENT <10mL of urine submitted  Performed on concentrated sample  Blood culture #2 [[id number]] Collected: 05/25/23 1046    Lab Status: In process Specimen: Blood from Arm, Right Updated: 05/25/23 1109    Blood culture #1 [[id number]] Collected: 05/25/23 1046    Lab Status: In process Specimen: Blood from Arm, Left Updated: 05/25/23 1109    CBC and differential [[id number]]  (Abnormal) Collected: 05/25/23 1046    Lab Status: Final result Specimen: Blood from Arm, Left Updated: 05/25/23 1107     WBC 10 42 Thousand/uL      RBC 4 84 Million/uL      Hemoglobin 13 3 g/dL      Hematocrit 41 7 %      MCV 86 fL      MCH 27 5 pg      MCHC 31 9 g/dL      RDW 14 9 %      MPV 11 6 fL      Platelets 632 Thousands/uL      nRBC 0 /100 WBCs      Neutrophils Relative 76 %      Immat GRANS % 0 %      Lymphocytes Relative 20 %      Monocytes Relative 4 %      Eosinophils Relative 0 %      Basophils Relative 0 %      Neutrophils Absolute 7 84 Thousands/µL      Immature Grans Absolute 0 04 Thousand/uL      Lymphocytes Absolute 2 07 Thousands/µL      Monocytes Absolute 0 45 Thousand/µL      Eosinophils Absolute 0 00 Thousand/µL      Basophils Absolute 0 02 Thousands/µL                  CT abdomen pelvis with contrast   Final Result by Leonardo Boyd MD (05/25 3386)      Nonspecific, near-diffuse colitis, likely infectious/inflammatory  Workstation performed: [id number]                    Procedures  Procedures         ED Course                               SBIRT 20yo+    Flowsheet Row Most Recent Value   Initial Alcohol Screen: US AUDIT-C     1  How often do you have a drink containing alcohol? 0 Filed at: 05/25/2023 0953   2   How many drinks containing alcohol do you have on a typical day you are drinking? 0 Filed at: 05/25/2023 0953   3a  Male UNDER 65: How often do you have five or more drinks on one occasion? 0 Filed at: 05/25/2023 0953   3b  FEMALE Any Age, or MALE 65+: How often do you have 4 or more drinks on one occassion? 0 Filed at: 05/25/2023 0953   Audit-C Score 0 Filed at: 05/25/2023 8179   MARK: How many times in the past year have you    Used an illegal drug or used a prescription medication for non-medical reasons? Never Filed at: 05/25/2023 [address] and [address] ordered and reviewed  No lactic acidosis noted  Mild leukocytosis with a left shift noted  Patient treated with Toradol, Pepcid, IV fluid bolus  Fever is improving  CT concerning for colitis which may be infectious or inflammatory  Will initiate course of Levaquin and Flagyl  I spoke to patient at length about her diagnosis  I attempted to send stool samples for C  difficile and culture, however patient is unable to provide a stool sample  Patient will be discharged to home with recommendation for outpatient follow-up with GI  Return precautions reviewed with patient  She agrees with discharge at this time  Amount and/or Complexity of Data Reviewed  Labs: ordered  Radiology: ordered  Risk  OTC drugs  Prescription drug management            Disposition  Final diagnoses:   Colitis   Diverticulosis   Abdominal pain     Time reflects when diagnosis was documented in both MDM as applicable and the Disposition within this note     Time User Action Codes Description Comment    5/25/2023  1:36 PM Candy Music Add [K52 9] Colitis     5/25/2023  1:36 PM Candy Music Add [K57 90] Diverticulosis     5/25/2023  1:36 PM Candy Music Add [R10 9] Abdominal pain       ED Disposition     ED Disposition   Discharge    Condition   Stable    Date/Time   Thu May 25, 2023  1:36 PM    Comment   Claude Clemente discharge to home/self care                Follow-up Information     Follow up With Specialties Details Why Contact Info Additional Information    [facility] Schedule an appointment as soon as possible for a visit in 1 day for follow up Mike Marvin [address] [facility] Schedule an appointment as soon as possible for a visit in 1 day for follow up [address]  [id number]  Emma Alex [address] [facility], [facility], [address], [address] [phone]          Patient's Medications   Discharge Prescriptions    LEVOFLOXACIN (LEVAQUIN) 750 MG TABLET    Take 1 tablet (750 mg total) by mouth every 24 hours for 9 days Do not start before May 26, 2023  Start Date: 5/26/2023 End Date: 6/4/2023       Order Dose: 750 mg       Quantity: 9 tablet    Refills: 0    METRONIDAZOLE (FLAGYL) 500 MG TABLET    Take 1 tablet (500 mg total) by mouth every 8 (eight) hours for 10 days       Start Date: 5/25/2023 End Date: 6/4/2023       Order Dose: 500 mg       Quantity: 30 tablet    Refills: 0    ONDANSETRON (ZOFRAN-ODT) 4 MG DISINTEGRATING TABLET    Take 1 tablet (4 mg total) by mouth every 8 (eight) hours as needed for nausea or vomiting       Start Date: 5/25/2023 End Date: --       Order Dose: 4 mg       Quantity: 10 tablet    Refills: 0       No discharge procedures on file      PDMP Review     None          ED Provider  Electronically Signed by           Ana Gayle DO  05/25/23 3049

## 2023-05-25 NOTE — Clinical Note
Crystal Velasquez was seen and treated in our emergency department on 5/25/2023  Diagnosis:     Amairani Diaz  may return to work on return date  She may return on this date: 05/29/2023         If you have any questions or concerns, please don't hesitate to call        Luis Alfredo Luke DO    ______________________________           _______________          _______________  Hospital Representative                              Date                                Time

## 2023-05-26 LAB
C DIFF TOX GENS STL QL NAA+PROBE: NEGATIVE
CAMPYLOBACTER DNA SPEC NAA+PROBE: ABNORMAL
SALMONELLA DNA SPEC QL NAA+PROBE: DETECTED
SHIGA TOXIN STX GENE SPEC NAA+PROBE: ABNORMAL
SHIGELLA DNA SPEC QL NAA+PROBE: ABNORMAL

## 2023-05-27 LAB — CULTURE ID FROM ENTERIC PCR: NORMAL

## 2023-05-28 LAB
BACTERIA BLD CULT: NORMAL
BACTERIA BLD CULT: NORMAL

## 2023-05-30 LAB
BACTERIA BLD CULT: NORMAL
BACTERIA BLD CULT: NORMAL

## 2023-06-01 ENCOUNTER — OFFICE VISIT (OUTPATIENT)
Dept: FAMILY MEDICINE CLINIC | Facility: CLINIC | Age: 58
End: 2023-06-01

## 2023-06-01 VITALS
HEART RATE: 80 BPM | DIASTOLIC BLOOD PRESSURE: 78 MMHG | OXYGEN SATURATION: 93 % | BODY MASS INDEX: 34.83 KG/M2 | SYSTOLIC BLOOD PRESSURE: 114 MMHG | TEMPERATURE: 98.8 F | RESPIRATION RATE: 16 BRPM | HEIGHT: 64 IN | WEIGHT: 204 LBS

## 2023-06-01 DIAGNOSIS — F32.A DEPRESSION, CONTROLLED: ICD-10-CM

## 2023-06-01 DIAGNOSIS — B07.0 PLANTAR WARTS: ICD-10-CM

## 2023-06-01 DIAGNOSIS — Z09 FOLLOW-UP EXAM: Primary | ICD-10-CM

## 2023-06-01 RX ORDER — PAROXETINE HYDROCHLORIDE 20 MG/1
20 TABLET, FILM COATED ORAL DAILY
Qty: 90 TABLET | Refills: 2 | Status: SHIPPED | OUTPATIENT
Start: 2023-06-01

## 2023-06-01 RX ORDER — IMIQUIMOD 12.5 MG/.25G
1 CREAM TOPICAL 3 TIMES WEEKLY
Qty: 12 EACH | Refills: 0 | Status: SHIPPED | OUTPATIENT
Start: 2023-06-02

## 2023-06-01 NOTE — PROGRESS NOTES
Name: Margie Willis      : 1965      MRN: [id number]  Encounter Provider: Mckay Christiansen DO  Encounter Date: 2023   Encounter department: [facility]     1  Follow-up exam  -Reports Salmonella has improved, no abdominal pain and normal bowel movement yesterday      2  Depression, controlled  -Refill  -     PARoxetine (PAXIL) 20 mg tablet; Take 1 tablet (20 mg total) by mouth daily    3  Plantar warts  - Refill  -     imiquimod (ALDARA) 5 % cream; Apply 1 packet topically 3 (three) times a week           Subjective      HPI   Seen for follow-up to ER visit with confirmed Salmonella diagnosis  Reports symptoms have resolved  Review of Systems   Constitutional: Negative for chills, fatigue and fever  HENT: Negative for congestion, ear pain, hearing loss, rhinorrhea, sore throat and trouble swallowing  Eyes: Negative for pain and visual disturbance  Respiratory: Negative for cough and shortness of breath  Cardiovascular: Negative for chest pain  Gastrointestinal: Negative for constipation, diarrhea, nausea and vomiting  Endocrine: Negative for polyuria  Genitourinary: Negative for difficulty urinating and dysuria  Musculoskeletal: Negative for arthralgias and myalgias  Skin: Positive for wound (wart)  Neurological: Negative for dizziness, light-headedness and headaches  Psychiatric/Behavioral: The patient is nervous/anxious  Current Outpatient Medications on File Prior to Visit   Medication Sig   • levofloxacin (LEVAQUIN) 750 mg tablet Take 1 tablet (750 mg total) by mouth every 24 hours for 9 days Do not start before May 26, 2023     • ondansetron (ZOFRAN-ODT) 4 mg disintegrating tablet Take 1 tablet (4 mg total) by mouth every 8 (eight) hours as needed for nausea or vomiting   • [DISCONTINUED] imiquimod (ALDARA) 5 % cream Apply 1 packet topically 3 (three) times a week   • [DISCONTINUED] PARoxetine (PAXIL) 20 mg tablet Take 1 tablet (20 mg "total) by mouth daily   • meloxicam (MOBIC) 15 mg tablet Take 1 tablet (15 mg total) by mouth daily for 15 days   • metroNIDAZOLE (FLAGYL) 500 mg tablet Take 1 tablet (500 mg total) by mouth every 8 (eight) hours for 10 days (Patient not taking: Reported on 6/1/2023)       Objective     /78 (BP Location: Left arm, Patient Position: Sitting, Cuff Size: Standard)   Pulse 80   Temp 98 8 °F (37 1 °C) (Tympanic)   Resp 16   Ht 5' 4\" (1 626 m)   Wt 92 5 kg (204 lb)   LMP  (LMP Unknown)   SpO2 93%   BMI 35 02 kg/m²     Physical Exam  Constitutional:       General: She is not in acute distress  Appearance: She is obese  She is not toxic-appearing  HENT:      Head: Normocephalic and atraumatic  Mouth/Throat:      Mouth: Mucous membranes are moist    Eyes:      Pupils: Pupils are equal, round, and reactive to light  Cardiovascular:      Rate and Rhythm: Normal rate and regular rhythm  Pulmonary:      Effort: Pulmonary effort is normal    Abdominal:      General: Bowel sounds are normal       Palpations: Abdomen is soft  Tenderness: There is no abdominal tenderness  Musculoskeletal:         General: No deformity  Cervical back: Normal range of motion  Right lower leg: No edema  Left lower leg: No edema  Skin:     General: Skin is warm and dry  Neurological:      Mental Status: She is alert and oriented to person, place, and time     Psychiatric:         Mood and Affect: Mood normal          Behavior: Behavior normal        Lydia Ketan, DO  "

## 2023-06-02 LAB — CULTURE ID FROM ENTERIC PCR: ABNORMAL

## 2023-06-13 ENCOUNTER — TELEPHONE (OUTPATIENT)
Dept: FAMILY MEDICINE CLINIC | Facility: CLINIC | Age: 58
End: 2023-06-13

## 2023-06-13 NOTE — TELEPHONE ENCOUNTER
Patient was seen on 6/1/23 for ED follow up for salmonella  Patient has an appt with gastro on 6/21 @11:40am  She is needing a referral to gastro  Patient said that she would be in on Monday to collect the referral  Advised that we will call once the referral was ready  Please call [phone] when ready

## 2023-06-20 DIAGNOSIS — A02.0 SALMONELLA ENTERITIS: Primary | ICD-10-CM

## 2023-06-20 NOTE — TELEPHONE ENCOUNTER
Patient in office checking the status of her request for a referral to gastro  Her appt is tomorrow, 6/21 at 11:40am  She is needing this done before her appt

## 2023-06-21 ENCOUNTER — OFFICE VISIT (OUTPATIENT)
Dept: GASTROENTEROLOGY | Facility: CLINIC | Age: 58
End: 2023-06-21
Payer: COMMERCIAL

## 2023-06-21 VITALS
SYSTOLIC BLOOD PRESSURE: 118 MMHG | HEIGHT: 64 IN | DIASTOLIC BLOOD PRESSURE: 80 MMHG | BODY MASS INDEX: 34.93 KG/M2 | WEIGHT: 204.6 LBS | HEART RATE: 80 BPM

## 2023-06-21 DIAGNOSIS — A02.0 SALMONELLA ENTERITIS: ICD-10-CM

## 2023-06-21 DIAGNOSIS — R79.89 ELEVATED LFTS: ICD-10-CM

## 2023-06-21 DIAGNOSIS — R19.7 DIARRHEA, UNSPECIFIED TYPE: ICD-10-CM

## 2023-06-21 DIAGNOSIS — R10.84 GENERALIZED ABDOMINAL PAIN: Primary | ICD-10-CM

## 2023-06-21 LAB — HCV AB SER-ACNC: NEGATIVE

## 2023-06-21 PROCEDURE — 99204 OFFICE O/P NEW MOD 45 MIN: CPT | Performed by: INTERNAL MEDICINE

## 2023-06-21 NOTE — PROGRESS NOTES
[facility] - Outpatient Consultation  Kendal Clark 62 y o  female MRN: [id number]  Encounter: [id number]          ASSESSMENT AND PLAN:      1  Generalized abdominal pain    2  Diarrhea, unspecified type    3  Salmonella enteritis    4  Elevated LFTs  -     Hepatic function panel; Future  -     Chronic Hepatitis Panel; Future      Patient had recent episode of abdominal pain nausea vomiting diarrhea, Salmonella enterocolitis, no clinical evidence of acute abdomen ileus obstruction  She is back to her usual health, her GI symptoms have resolved  I do not see need for any further work-up at this time  She had colonoscopy done which revealed small polyp and diverticulosis in October 2021, 5-year follow-up was recommended  Mild elevation of LFTs, AST 66 ALT is 83 1 , this may be related to inflammation during his acute foodborne illness  Will repeat LFTs  She tells me she has been vaccinated for hepatitis a and B  If any recurrent symptoms she will call us       ______________________________________________________________________    HP I:      Patient came in for evaluation of her recent GI episode, she developed central lower abdominal pain cramping nausea vomiting diarrhea, felt weak tired went to the ED, CT of the abdomen showed some colitis, stool studies showed Salmonella infection  She not sure if she had some ground beef which may not be properly cooked  Though symptoms have resolved, no further abdominal pain diarrhea nausea vomiting, she is eating regular and having normal bowel movements, no blood  No melena hematochezia  Appetite is fair good weight stable no chest pain shortness of breath, has occasional indigestion, denies any fever chills rash, no one else around her sick  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[employer] 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            REVIEW OF SYSTEMS:    CONSTITUTIONAL: Denies any fever, "chills, rigors, and weight loss  HEENT: No earache or tinnitus  CARDIOVASCULAR: No chest pain or palpitations  RESPIRATORY: Denies any cough, hemoptysis, shortness of breath or dyspnea on exertion  GASTROINTESTINAL: As noted in the History of Present Illness  GENITOURINARY: Denies any hematuria or dysuria  NEUROLOGIC: No dizziness or vertigo  MUSCULOSKELETAL: Denies any joint swellings  SKIN: Denies skin rashes or itching  ENDOCRINE: Denies excessive thirst  Denies intolerance to heat or cold  PSYCHOSOCIAL: Denies depression or anxiety  Denies any recent memory loss  Historical Information   Past Medical History:   Diagnosis Date   • Closed fracture of left lateral malleolus     last assessed - 27Jan2014   • Depression      Past Surgical History:   Procedure Laterality Date   • COLONOSCOPY     • WISDOM TOOTH EXTRACTION       Social History   Social History     Substance and Sexual Activity   Alcohol Use No    Comment: socially     Social History     Substance and Sexual Activity   Drug Use Yes   • Types: Marijuana    Comment: hx marijuana use-quit 1990     Social History     Tobacco Use   Smoking Status Never   Smokeless Tobacco Never     Family History   Problem Relation Age of Onset   • Arthritis Family    • Hypertension Mother    • Heart attack Father    • No Known Problems Sister    • No Known Problems Daughter    • No Known Problems Daughter    • No Known Problems Maternal Aunt    • No Known Problems Maternal Aunt    • No Known Problems Paternal Aunt        Meds/Allergies       Current Outpatient Medications:   •  imiquimod (ALDARA) 5 % cream  •  ondansetron (ZOFRAN-ODT) 4 mg disintegrating tablet  •  PARoxetine (PAXIL) 20 mg tablet  •  meloxicam (MOBIC) 15 mg tablet    Allergies   Allergen Reactions   • Other Allergic Rhinitis     Seasonal, pollen           Objective     Blood pressure 118/80, pulse 80, height 5' 4\" (1 626 m), weight 92 8 kg (204 lb 9 6 oz)   Body mass index is 35 12 " kg/m²         PHYSICAL EXAM:      General Appearance:   Alert, cooperative, no distress   HEENT:   Normocephalic, atraumatic, anicteric  Neck:  Supple, symmetrical, trachea midline   Lungs:   Clear to auscultation bilaterally; no rales, rhonchi or wheezing; respirations unlabored    Heart[de-identified]   Regular rate and rhythm; no murmur  Abdomen:   Soft, non-tender, non-distended; normal bowel sounds; no masses, no organomegaly    Genitalia:   Deferred    Rectal:   Deferred    Extremities:  No cyanosis, clubbing or edema    Skin:  No jaundice, rashes, or lesions    Lymph nodes:  No palpable cervical lymphadenopathy        Lab Results:   No visits with results within 1 Day(s) from this visit     Latest known visit with results is:   Admission on 05/25/2023, Discharged on 05/25/2023   Component Date Value   • WBC 05/25/2023 10 42 (H)    • RBC 05/25/2023 4 84    • Hemoglobin 05/25/2023 13 3    • Hematocrit 05/25/2023 41 7    • MCV 05/25/2023 86    • MCH 05/25/2023 27 5    • MCHC 05/25/2023 31 9    • RDW 05/25/2023 14 9    • MPV 05/25/2023 11 6    • Platelets 39/70/9269 218    • nRBC 05/25/2023 0    • Neutrophils Relative 05/25/2023 76 (H)    • Immat GRANS % 05/25/2023 0    • Lymphocytes Relative 05/25/2023 20    • Monocytes Relative 05/25/2023 4    • Eosinophils Relative 05/25/2023 0    • Basophils Relative 05/25/2023 0    • Neutrophils Absolute 05/25/2023 7 84 (H)    • Immature Grans Absolute 05/25/2023 0 04    • Lymphocytes Absolute 05/25/2023 2 07    • Monocytes Absolute 05/25/2023 0 45    • Eosinophils Absolute 05/25/2023 0 00    • Basophils Absolute 05/25/2023 0 02    • Sodium 05/25/2023 138    • Potassium 05/25/2023 3 7    • Chloride 05/25/2023 104    • CO2 05/25/2023 25    • ANION GAP 05/25/2023 9    • BUN 05/25/2023 11    • Creatinine 05/25/2023 0 77    • Glucose 05/25/2023 96    • Calcium 05/25/2023 9 1    • AST 05/25/2023 66 (H)    • ALT 05/25/2023 83 (H)    • Alkaline Phosphatase 05/25/2023 140 (H)    • Total Protein 05/25/2023 8 2    • Albumin 05/25/2023 4 1    • Total Bilirubin 05/25/2023 0 55    • eGFR 05/25/2023 85    • Lipase 05/25/2023 <6 (L)    • Blood Culture 05/25/2023 No Growth After 5 Days  • Blood Culture 05/25/2023 No Growth After 5 Days  • LACTIC ACID 05/25/2023 0 9    • Color, UA 05/25/2023 Yellow    • Clarity, UA 05/25/2023 Cloudy    • Specific Gravity, UA 05/25/2023 >=1 030    • pH, UA 05/25/2023 6 0    • Leukocytes, UA 05/25/2023 Negative    • Nitrite, UA 05/25/2023 Negative    • Protein, UA 05/25/2023 100 (2+) (A)    • Glucose, UA 05/25/2023 Negative    • Ketones, UA 05/25/2023 15 (1+) (A)    • Urobilinogen, UA 05/25/2023 0 2    • Bilirubin, UA 05/25/2023 Moderate (A)    • Occult Blood, UA 05/25/2023 Large (A)    • URINE COMMENT 05/25/2023 <10mL of urine submitted  Performed on concentrated sample  • RBC, UA 05/25/2023 2-4    • WBC, UA 05/25/2023 1-2    • Epithelial Cells 05/25/2023 Moderate (A)    • Bacteria, UA 05/25/2023 Innumerable (A)    • Hyaline Casts, UA 05/25/2023 0-1 (A)    • MUCUS THREADS 05/25/2023 Occasional (A)    • URINE COMMENT 05/25/2023 <10mL of urine submitted  Performed on concentrated sample  • C difficile toxin by PCR 05/25/2023 Negative    • Salmonella sp PCR 05/25/2023 Detected (A)    • Shigella sp/Enteroinvasi* 05/25/2023 None Detected    • Campylobacter sp (jejuni* 05/25/2023 None Detected    • Shiga toxin 1/Shiga toxi* 05/25/2023 None Detected    • Culture ID from Enteric * 05/25/2023 Salmonella species (A)          Radiology Results:   CT abdomen pelvis with contrast    Result Date: 5/25/2023  Narrative: CT ABDOMEN AND PELVIS WITH IV CONTRAST INDICATION:   Abdominal pain, acute, nonlocalized abd pain/fever  Per ED notes, sharp abdominal pain with nausea, vomiting and diarrhea which started Tuesday  Weakness  Anorexia  COMPARISON: 8/12/2021 pelvic ultrasound TECHNIQUE:  CT examination of the abdomen and pelvis was performed   Axial, sagittal, and coronal 2D reformatted images were created from the source data and submitted for interpretation  Radiation dose length product (DLP) for this visit:  608 57 mGy-cm   This examination, like all CT scans performed in the [facility], was performed utilizing techniques to minimize radiation dose exposure, including the use of iterative  reconstruction and automated exposure control  IV Contrast:  100 mL of iohexol (OMNIPAQUE) Enteric Contrast:  Enteric contrast was not administered  FINDINGS: ABDOMEN LOWER CHEST:  No clinically significant abnormality identified in the visualized lower chest  LIVER/BILIARY TREE:  Within normal limits  GALLBLADDER:  Within normal limits  SPLEEN:  Within normal limits  PANCREAS:  Within normal limits  ADRENAL GLANDS:  Within normal limits  KIDNEYS/URETERS:  Within normal limits  STOMACH AND BOWEL: Visualized esophagus, stomach and small bowel are within normal limits  Edematous-type wall thickening from the cecum to the splenic flexure  Surrounding fatty infiltrative change  Mild ascending colon and cecum diverticulosis  No pneumatosis or bowel dilation  APPENDIX:  Within normal limits  ABDOMINOPELVIC CAVITY:  No abnormal air, fluid or enlarged lymph nodes  VESSELS: Within normal limits  PELVIS: REPRODUCTIVE ORGANS: Fibroid uterus  No adnexal masses  URINARY BLADDER:  Within normal limits  ABDOMINAL WALL/INGUINAL REGIONS: Small, fat-containing umbilical hernia  OSSEOUS STRUCTURES: Mild degenerative changes  The study was marked in [facility] for immediate notification  Impression: Nonspecific, near-diffuse colitis, likely infectious/inflammatory   Workstation performed: [id number]

## 2023-11-15 ENCOUNTER — ANNUAL EXAM (OUTPATIENT)
Dept: OBGYN CLINIC | Facility: CLINIC | Age: 58
End: 2023-11-15
Payer: COMMERCIAL

## 2023-11-15 VITALS
DIASTOLIC BLOOD PRESSURE: 78 MMHG | HEIGHT: 64 IN | SYSTOLIC BLOOD PRESSURE: 118 MMHG | WEIGHT: 206 LBS | BODY MASS INDEX: 35.17 KG/M2

## 2023-11-15 DIAGNOSIS — Z12.4 SCREENING FOR MALIGNANT NEOPLASM OF THE CERVIX: ICD-10-CM

## 2023-11-15 DIAGNOSIS — Z11.51 SCREENING FOR HUMAN PAPILLOMAVIRUS (HPV): ICD-10-CM

## 2023-11-15 DIAGNOSIS — Z01.419 ENCOUNTER FOR GYNECOLOGICAL EXAMINATION WITHOUT ABNORMAL FINDING: Primary | ICD-10-CM

## 2023-11-15 DIAGNOSIS — N95.0 POSTMENOPAUSE BLEEDING: ICD-10-CM

## 2023-11-15 DIAGNOSIS — Z12.31 SCREENING MAMMOGRAM, ENCOUNTER FOR: ICD-10-CM

## 2023-11-15 PROCEDURE — 99396 PREV VISIT EST AGE 40-64: CPT | Performed by: OBSTETRICS & GYNECOLOGY

## 2023-11-15 PROCEDURE — G0476 HPV COMBO ASSAY CA SCREEN: HCPCS | Performed by: OBSTETRICS & GYNECOLOGY

## 2023-11-15 PROCEDURE — G0145 SCR C/V CYTO,THINLAYER,RESCR: HCPCS | Performed by: OBSTETRICS & GYNECOLOGY

## 2023-11-15 NOTE — PROGRESS NOTES
Subjective      Hamilton Blas is a 62 y.o. female who presents for annual well woman exam.       History of abnormal Pap smear: no  Family history of uterine or ovarian cancer: no    Family history of breast cancer: no    Menstrual History:  OB History          2    Para   2    Term   2            AB        Living   2         SAB        IAB        Ectopic        Multiple        Live Births   2           Obstetric Comments   2                 No LMP recorded (lmp unknown). Patient is postmenopausal.       The following portions of the patient's history were reviewed and updated as appropriate: allergies, current medications, past family history, past medical history, past social history, past surgical history, and problem list.    Review of Systems  Review of Systems   Constitutional:  Negative for activity change, appetite change, chills, fatigue and fever. Respiratory:  Negative for apnea, cough, chest tightness and shortness of breath. Cardiovascular:  Negative for chest pain, palpitations and leg swelling. Gastrointestinal:  Negative for abdominal pain, constipation, diarrhea, nausea and vomiting. Genitourinary:  Negative for difficulty urinating, dysuria, flank pain, frequency, hematuria and urgency. Neurological:  Negative for dizziness, seizures, syncope, light-headedness, numbness and headaches. Psychiatric/Behavioral:  Negative for agitation and confusion.            Objective      /78 (BP Location: Left arm, Patient Position: Sitting, Cuff Size: Adult)   Ht 5' 4" (1.626 m)   Wt 93.4 kg (206 lb)   LMP  (LMP Unknown)   BMI 35.36 kg/m²     Physical Exam  OBGyn Exam     General:   alert and oriented, in no acute distress, alert, appears stated age, and cooperative   Heart: regular rate and rhythm, S1, S2 normal, no murmur, click, rub or gallop   Lungs: clear to auscultation bilaterally   Abdomen: soft, non-tender, without masses or organomegaly   Vulva: normal   Vagina: normal mucosa, normal discharge, pessary in place     Cervix: Complete uterovaginal prolapse no lesion no abnormality noted on the cervix Pap smear obtained   Uterus: Complete uterovaginal prolapse   Adnexa:  Breast Exam:  normal adnexa  breasts appear normal, no suspicious masses, no skin or nipple changes or axillary nodes. Assessment      @well woman@ . 80-year-old female   Annual exam   uterovaginal prolapse third/fourth degree\respond to pessary   Had episodes of perimenopausal bleeding  Fibroid uterus  Pessary ring with support 4. Plan  Pap/HPV  Diet/exercise  Calcium/vitamin-D  Mammogram  Up-to-date with colonoscopy  Return to office for annual exam   continue doing Kegel exercise  Pelvic ultrasound evaluate endometrial thickness secondary to episode of postmenopausal bleeding if endometrial thickness elevated need to return to office for endometrial biopsy     All questions answered. There are no Patient Instructions on file for this visit.

## 2023-11-20 LAB
HPV HR 12 DNA CVX QL NAA+PROBE: NEGATIVE
HPV16 DNA CVX QL NAA+PROBE: NEGATIVE
HPV18 DNA CVX QL NAA+PROBE: NEGATIVE

## 2023-11-25 LAB
LAB AP GYN PRIMARY INTERPRETATION: NORMAL
Lab: NORMAL

## 2023-11-30 ENCOUNTER — HOSPITAL ENCOUNTER (OUTPATIENT)
Dept: RADIOLOGY | Facility: HOSPITAL | Age: 58
Discharge: HOME | End: 2023-11-30
Attending: OBSTETRICS & GYNECOLOGY
Payer: COMMERCIAL

## 2023-11-30 DIAGNOSIS — N95.0 POSTMENOPAUSE BLEEDING: ICD-10-CM

## 2023-11-30 PROCEDURE — 76856 US EXAM PELVIC COMPLETE: CPT

## 2023-11-30 PROCEDURE — 76830 TRANSVAGINAL US NON-OB: CPT

## 2023-12-05 ENCOUNTER — TELEPHONE (OUTPATIENT)
Dept: OBGYN CLINIC | Facility: CLINIC | Age: 58
End: 2023-12-05

## 2023-12-05 NOTE — TELEPHONE ENCOUNTER
GEORGE from [facility] called they have a report from a pelvic ultrasound from 11/30/2023 with significant finding,  in patient chart  for your review

## 2023-12-06 ENCOUNTER — TELEPHONE (OUTPATIENT)
Dept: FAMILY MEDICINE CLINIC | Facility: CLINIC | Age: 58
End: 2023-12-06

## 2023-12-11 ENCOUNTER — TELEPHONE (OUTPATIENT)
Dept: OBGYN CLINIC | Facility: CLINIC | Age: 58
End: 2023-12-11

## 2023-12-11 NOTE — TELEPHONE ENCOUNTER
Patient in office checking status of jury beata letter. She is stating that she has a lot going on and cannot do jury duty. She is wanting this letter ASAP. She will be back for the letter tomorrow.

## 2023-12-12 NOTE — TELEPHONE ENCOUNTER
Letter at  for pickup, made copy placed in to be scanned bin, PT stated she would come back in tomorrow 12/13/23.

## 2023-12-12 NOTE — TELEPHONE ENCOUNTER
Pt came in to office asking for the letter she requested to be exempt from jury duty. Pt explained she has a lot going on and is dealing with depression. Adv I would send request again. Sent TT to Dr. Deyvi Rowland.

## 2023-12-14 ENCOUNTER — CONSULT (OUTPATIENT)
Dept: OBGYN CLINIC | Facility: CLINIC | Age: 58
End: 2023-12-14
Payer: COMMERCIAL

## 2023-12-14 VITALS
SYSTOLIC BLOOD PRESSURE: 138 MMHG | HEIGHT: 64 IN | WEIGHT: 209 LBS | BODY MASS INDEX: 35.68 KG/M2 | DIASTOLIC BLOOD PRESSURE: 80 MMHG

## 2023-12-14 DIAGNOSIS — R93.89 ABNORMAL PELVIC ULTRASOUND: Primary | ICD-10-CM

## 2023-12-14 PROCEDURE — 99213 OFFICE O/P EST LOW 20 MIN: CPT | Performed by: OBSTETRICS & GYNECOLOGY

## 2023-12-14 NOTE — PROGRESS NOTES
Assessment/Plan:     Diagnoses and all orders for this visit:    Abnormal pelvic ultrasound       26-year-old female   Abnormal ultrasound elevated endometrial thickness 21 mm   uterovaginal prolapse third/fourth degree\respond to pessary   Had episodes of perimenopausal bleeding  Fibroid uterus  Pessary ring with support 4. Plan  Will proceed with hysteroscopy D&C evaluation of the endometrium cavity  Diet/exercise  Calcium/vitamin-D  Mammogram  Up-to-date with colonoscopy  Return to office for annual exam   continue doing Kegel exercise         Subjective:      Patient ID: Rajeev Trotter is a 62 y.o. female. HPI  Patient seen evaluated present to the office today to discuss ultrasound result which was done secondary to postmenopausal bleeding      UTERUS:  The uterus is anteverted in position, measuring 10.8 x 5.3 x 6.0 cm. Right fibroid measures 4.4 x 3.9 x 3.2 cm, prior measurement 4.2 x 3.4 x 3.5 cm. Anterior intramural fibroid measures 3 x 2.9 x 3.3 cm. Prior measurement 3.3 x 2.9 x 2.9 cm. There is mass effect on the endometrium. Small right lower fibroid measures 1.2 x 0.8 x 1.1 cm. The cervix appears within normal limits. ENDOMETRIUM:  The endometrial echo complex has an AP caliber of 21.0 mm. Markedly thickened for a postmenopausal patient, with vascularity. OVARIES/ADNEXA:  Ovaries are not well visualized. No suspicious adnexal abnormality. OTHER:  No free fluid or loculated fluid collections. IMPRESSION:     1. Thickened endometrium with vascularity. Correlate clinically to differentiate hyperplasia from possible neoplasm. 2. Minimal change in uterine fibroids. Anterior fibroid has mass effect on the endometrium. 3. Ovaries are not well visualized.        Results reviewed and discussed with patient based on the endometrial thickness I would recommend to proceed with hysteroscopy D&C evaluation of the endometrium cavity patient has endometrial biopsy done in 2021 and came back benign  Procedure reviewed and discussed with patient risk of bleeding, infection, blood transfusion, perforation, need another surgery, risk of anesthesia all explained and discussed with patient all patient questions answered and patient was satisfied        The following portions of the patient's history were reviewed and updated as appropriate: allergies, current medications, past family history, past medical history, past social history, past surgical history and problem list.    Review of Systems      Objective:      /80 (BP Location: Left arm, Patient Position: Sitting, Cuff Size: Adult)   Ht 5' 4" (1.626 m)   Wt 94.8 kg (209 lb)   LMP  (LMP Unknown)   BMI 35.87 kg/m²          Physical Exam  Constitutional:       Appearance: She is well-developed. Cardiovascular:      Rate and Rhythm: Normal rate and regular rhythm. Heart sounds: Normal heart sounds. Pulmonary:      Effort: Pulmonary effort is normal.      Breath sounds: Normal breath sounds. Abdominal:      General: There is no distension. Palpations: Abdomen is soft. Tenderness: There is no abdominal tenderness. Musculoskeletal:      Right lower leg: No edema. Left lower leg: No edema. Neurological:      Mental Status: She is alert and oriented to person, place, and time.    Psychiatric:         Behavior: Behavior normal.

## 2023-12-19 ENCOUNTER — TELEPHONE (OUTPATIENT)
Dept: OBGYN CLINIC | Facility: CLINIC | Age: 58
End: 2023-12-19

## 2023-12-19 NOTE — TELEPHONE ENCOUNTER
.Called pts insurance to see if pt needs prior auth for OUT PT. surgery on 01/08/24   Using code [id number] (D+C)    PA REQUIRED and initiated over the phone today, 12/19/23. Clinicals also faxed for review to fax # [phone]    Pending Auth # [id number]  PA CURRENTLY PENDING

## 2023-12-20 NOTE — TELEPHONE ENCOUNTER
Received determination     [place] APPROVED FOR CPT 21001  DR. BRENTON DICKSON [facility]  AUTH # [id number]  VALID: 01/08/24-02/08/24

## 2023-12-27 NOTE — PRE-PROCEDURE INSTRUCTIONS
Pre-Surgery Instructions:   Medication Instructions    PARoxetine (PAXIL) 20 mg tablet Take day of surgery.   Medication instructions for day surgery reviewed. Please use only a sip of water to take your instructed medications. Avoid all over the counter vitamins, supplements and NSAIDS for one week prior to surgery per anesthesia guidelines. Tylenol is ok to take as needed.     You will receive a call one business day prior to surgery with an arrival time and hospital directions. If your surgery is scheduled on a Monday, the hospital will be calling you on the Friday prior to your surgery. If you have not heard from anyone by 8pm, please call the hospital supervisor through the hospital  at [phone]. ([place] [phone]).    Do not eat or drink anything after midnight the night before your surgery, including candy, mints, lifesavers, or chewing gum. Do not drink alcohol 24hrs before your surgery. Try not to smoke at least 24hrs before your surgery.       Follow the pre surgery showering instructions as listed in the “My Surgical Experience Booklet” or otherwise provided by your surgeon's office. Do not use a blade to shave the surgical area 1 week before surgery. It is okay to use a clean electric clippers up to 24 hours before surgery. Do not apply any lotions, creams, including makeup, cologne, deodorant, or perfumes after showering on the day of your surgery. Do not use dry shampoo, hair spray, hair gel, or any type of hair products.     No contact lenses, eye make-up, or artificial eyelashes. Remove nail polish, including gel polish, and any artificial, gel, or acrylic nails if possible. Remove all jewelry including rings and body piercing jewelry.     Wear causal clothing that is easy to take on and off. Consider your type of surgery.    Keep any valuables, jewelry, piercings at home. Please bring any specially ordered equipment (sling, braces) if indicated.    Arrange for a responsible person  to drive you to and from the hospital on the day of your surgery. Visitor Guidelines discussed.     Call the surgeon's office with any new illnesses, exposures, or additional questions prior to surgery.    Please reference your “My Surgical Experience Booklet” for additional information to prepare for your upcoming surgery.

## 2024-01-08 ENCOUNTER — ANESTHESIA (OUTPATIENT)
Dept: PERIOP | Facility: HOSPITAL | Age: 59
End: 2024-01-08
Payer: COMMERCIAL

## 2024-01-08 ENCOUNTER — HOSPITAL ENCOUNTER (OUTPATIENT)
Facility: HOSPITAL | Age: 59
Setting detail: OUTPATIENT SURGERY
Discharge: HOME | End: 2024-01-08
Attending: OBSTETRICS & GYNECOLOGY | Admitting: OBSTETRICS & GYNECOLOGY
Payer: COMMERCIAL

## 2024-01-08 ENCOUNTER — ANESTHESIA EVENT (OUTPATIENT)
Dept: PERIOP | Facility: HOSPITAL | Age: 59
End: 2024-01-08
Payer: COMMERCIAL

## 2024-01-08 VITALS
OXYGEN SATURATION: 99 % | BODY MASS INDEX: 34.66 KG/M2 | WEIGHT: 203 LBS | HEIGHT: 64 IN | TEMPERATURE: 98.5 F | DIASTOLIC BLOOD PRESSURE: 82 MMHG | SYSTOLIC BLOOD PRESSURE: 118 MMHG | RESPIRATION RATE: 16 BRPM | HEART RATE: 88 BPM

## 2024-01-08 DIAGNOSIS — R93.89 THICKENED ENDOMETRIUM: ICD-10-CM

## 2024-01-08 DIAGNOSIS — F32.A DEPRESSION, CONTROLLED: ICD-10-CM

## 2024-01-08 LAB
EXT PREGNANCY TEST URINE: NEGATIVE
EXT. CONTROL: NORMAL

## 2024-01-08 PROCEDURE — 58558 HYSTEROSCOPY BIOPSY: CPT | Performed by: OBSTETRICS & GYNECOLOGY

## 2024-01-08 PROCEDURE — 81025 URINE PREGNANCY TEST: CPT | Performed by: OBSTETRICS & GYNECOLOGY

## 2024-01-08 PROCEDURE — NC001 PR NO CHARGE: Performed by: OBSTETRICS & GYNECOLOGY

## 2024-01-08 PROCEDURE — 88305 TISSUE EXAM BY PATHOLOGIST: CPT | Performed by: STUDENT IN AN ORGANIZED HEALTH CARE EDUCATION/TRAINING PROGRAM

## 2024-01-08 RX ORDER — ONDANSETRON 2 MG/ML
4 INJECTION INTRAMUSCULAR; INTRAVENOUS ONCE AS NEEDED
Status: DISCONTINUED | OUTPATIENT
Start: 2024-01-08 | End: 2024-01-08 | Stop reason: HOSPADM

## 2024-01-08 RX ORDER — ACETAMINOPHEN 325 MG/1
975 TABLET ORAL EVERY 6 HOURS PRN
Status: DISCONTINUED | OUTPATIENT
Start: 2024-01-08 | End: 2024-01-08 | Stop reason: HOSPADM

## 2024-01-08 RX ORDER — FENTANYL CITRATE/PF 50 MCG/ML
50 SYRINGE (ML) INJECTION
Status: DISCONTINUED | OUTPATIENT
Start: 2024-01-08 | End: 2024-01-08 | Stop reason: HOSPADM

## 2024-01-08 RX ORDER — METOCLOPRAMIDE HYDROCHLORIDE 5 MG/ML
10 INJECTION INTRAMUSCULAR; INTRAVENOUS ONCE AS NEEDED
Status: DISCONTINUED | OUTPATIENT
Start: 2024-01-08 | End: 2024-01-08 | Stop reason: HOSPADM

## 2024-01-08 RX ORDER — SODIUM CHLORIDE, SODIUM LACTATE, POTASSIUM CHLORIDE, CALCIUM CHLORIDE 600; 310; 30; 20 MG/100ML; MG/100ML; MG/100ML; MG/100ML
100 INJECTION, SOLUTION INTRAVENOUS CONTINUOUS
Status: DISCONTINUED | OUTPATIENT
Start: 2024-01-08 | End: 2024-01-08 | Stop reason: HOSPADM

## 2024-01-08 RX ORDER — MIDAZOLAM HYDROCHLORIDE 2 MG/2ML
INJECTION, SOLUTION INTRAMUSCULAR; INTRAVENOUS AS NEEDED
Status: DISCONTINUED | OUTPATIENT
Start: 2024-01-08 | End: 2024-01-08

## 2024-01-08 RX ORDER — PROPOFOL 10 MG/ML
INJECTION, EMULSION INTRAVENOUS AS NEEDED
Status: DISCONTINUED | OUTPATIENT
Start: 2024-01-08 | End: 2024-01-08

## 2024-01-08 RX ORDER — KETOROLAC TROMETHAMINE 30 MG/ML
INJECTION, SOLUTION INTRAMUSCULAR; INTRAVENOUS AS NEEDED
Status: DISCONTINUED | OUTPATIENT
Start: 2024-01-08 | End: 2024-01-08

## 2024-01-08 RX ORDER — FENTANYL CITRATE 50 UG/ML
INJECTION, SOLUTION INTRAMUSCULAR; INTRAVENOUS AS NEEDED
Status: DISCONTINUED | OUTPATIENT
Start: 2024-01-08 | End: 2024-01-08

## 2024-01-08 RX ORDER — IBUPROFEN 600 MG/1
600 TABLET ORAL EVERY 6 HOURS PRN
Status: DISCONTINUED | OUTPATIENT
Start: 2024-01-08 | End: 2024-01-08 | Stop reason: HOSPADM

## 2024-01-08 RX ORDER — ONDANSETRON 2 MG/ML
INJECTION INTRAMUSCULAR; INTRAVENOUS AS NEEDED
Status: DISCONTINUED | OUTPATIENT
Start: 2024-01-08 | End: 2024-01-08

## 2024-01-08 RX ORDER — ALBUTEROL SULFATE 2.5 MG/3ML
2.5 SOLUTION RESPIRATORY (INHALATION) ONCE AS NEEDED
Status: DISCONTINUED | OUTPATIENT
Start: 2024-01-08 | End: 2024-01-08 | Stop reason: HOSPADM

## 2024-01-08 RX ORDER — SODIUM CHLORIDE, SODIUM LACTATE, POTASSIUM CHLORIDE, CALCIUM CHLORIDE 600; 310; 30; 20 MG/100ML; MG/100ML; MG/100ML; MG/100ML
125 INJECTION, SOLUTION INTRAVENOUS CONTINUOUS
Status: DISCONTINUED | OUTPATIENT
Start: 2024-01-08 | End: 2024-01-08 | Stop reason: HOSPADM

## 2024-01-08 RX ORDER — LIDOCAINE HYDROCHLORIDE 10 MG/ML
INJECTION, SOLUTION EPIDURAL; INFILTRATION; INTRACAUDAL; PERINEURAL AS NEEDED
Status: DISCONTINUED | OUTPATIENT
Start: 2024-01-08 | End: 2024-01-08

## 2024-01-08 RX ORDER — SODIUM CHLORIDE, SODIUM LACTATE, POTASSIUM CHLORIDE, CALCIUM CHLORIDE 600; 310; 30; 20 MG/100ML; MG/100ML; MG/100ML; MG/100ML
INJECTION, SOLUTION INTRAVENOUS CONTINUOUS PRN
Status: DISCONTINUED | OUTPATIENT
Start: 2024-01-08 | End: 2024-01-08

## 2024-01-08 RX ORDER — PROMETHAZINE HYDROCHLORIDE 25 MG/ML
12.5 INJECTION, SOLUTION INTRAMUSCULAR; INTRAVENOUS ONCE AS NEEDED
Status: DISCONTINUED | OUTPATIENT
Start: 2024-01-08 | End: 2024-01-08 | Stop reason: HOSPADM

## 2024-01-08 RX ORDER — DEXAMETHASONE SODIUM PHOSPHATE 10 MG/ML
INJECTION, SOLUTION INTRAMUSCULAR; INTRAVENOUS AS NEEDED
Status: DISCONTINUED | OUTPATIENT
Start: 2024-01-08 | End: 2024-01-08

## 2024-01-08 RX ORDER — PROPOFOL 10 MG/ML
INJECTION, EMULSION INTRAVENOUS CONTINUOUS PRN
Status: DISCONTINUED | OUTPATIENT
Start: 2024-01-08 | End: 2024-01-08

## 2024-01-08 RX ORDER — ONDANSETRON 2 MG/ML
4 INJECTION INTRAMUSCULAR; INTRAVENOUS EVERY 6 HOURS PRN
Status: DISCONTINUED | OUTPATIENT
Start: 2024-01-08 | End: 2024-01-08 | Stop reason: HOSPADM

## 2024-01-08 RX ADMIN — PROPOFOL 100 MG: 10 INJECTION, EMULSION INTRAVENOUS at 11:37

## 2024-01-08 RX ADMIN — FENTANYL CITRATE 25 MCG: 50 INJECTION INTRAMUSCULAR; INTRAVENOUS at 11:43

## 2024-01-08 RX ADMIN — PROPOFOL 50 MG: 10 INJECTION, EMULSION INTRAVENOUS at 11:41

## 2024-01-08 RX ADMIN — ACETAMINOPHEN 975 MG: 325 TABLET, FILM COATED ORAL at 12:49

## 2024-01-08 RX ADMIN — LIDOCAINE HYDROCHLORIDE 50 MG: 10 INJECTION, SOLUTION EPIDURAL; INFILTRATION; INTRACAUDAL; PERINEURAL at 11:37

## 2024-01-08 RX ADMIN — FENTANYL CITRATE 50 MCG: 50 INJECTION INTRAMUSCULAR; INTRAVENOUS at 11:37

## 2024-01-08 RX ADMIN — KETOROLAC TROMETHAMINE 30 MG: 30 INJECTION INTRAMUSCULAR; INTRAVENOUS at 11:57

## 2024-01-08 RX ADMIN — SODIUM CHLORIDE, SODIUM LACTATE, POTASSIUM CHLORIDE, AND CALCIUM CHLORIDE: .6; .31; .03; .02 INJECTION, SOLUTION INTRAVENOUS at 10:08

## 2024-01-08 RX ADMIN — DEXAMETHASONE SODIUM PHOSPHATE 10 MG: 10 INJECTION INTRAMUSCULAR; INTRAVENOUS at 11:38

## 2024-01-08 RX ADMIN — FENTANYL CITRATE 25 MCG: 50 INJECTION INTRAMUSCULAR; INTRAVENOUS at 11:47

## 2024-01-08 RX ADMIN — MIDAZOLAM 2 MG: 1 INJECTION INTRAMUSCULAR; INTRAVENOUS at 11:35

## 2024-01-08 RX ADMIN — ONDANSETRON 4 MG: 2 INJECTION INTRAMUSCULAR; INTRAVENOUS at 11:38

## 2024-01-08 RX ADMIN — SODIUM CHLORIDE, SODIUM LACTATE, POTASSIUM CHLORIDE, AND CALCIUM CHLORIDE 125 ML/HR: .6; .31; .03; .02 INJECTION, SOLUTION INTRAVENOUS at 10:15

## 2024-01-08 RX ADMIN — PROPOFOL 100 MCG/KG/MIN: 10 INJECTION, EMULSION INTRAVENOUS at 11:40

## 2024-01-08 NOTE — ANESTHESIA POSTPROCEDURE EVALUATION
Post-Op Assessment Note    CV Status:  Stable  Pain Score: 0    Pain management: adequate       Mental Status:  Alert and awake   Hydration Status:  Euvolemic   PONV Controlled:  Controlled   Airway Patency:  Patent     Post Op Vitals Reviewed: Yes      Staff: Anesthesiologist, CRNA               BP   138/92   Temp   97.0   Pulse  86   Resp   18   SpO2   97%     
4

## 2024-01-08 NOTE — INTERVAL H&P NOTE
H&P reviewed. After examining the patient I find no changes in the patients condition since the H&P had been written.    Vitals:    01/08/24 1001   BP: 136/70   Pulse: 81   Resp: 14   Temp: 98.9 °F (37.2 °C)   SpO2: 99%

## 2024-01-08 NOTE — ANESTHESIA PREPROCEDURE EVALUATION
Procedure:  DILATATION AND CURETTAGE (D&C) WITH HYSTEROSCOPY. POSSIBLE POLYPECTOMY. (Uterus)    Relevant Problems   NEURO/PSYCH   (+) Anxiety   (+) Depression        Physical Exam    Airway    Mallampati score: III  TM Distance: >3 FB  Neck ROM: full     Dental       Cardiovascular      Pulmonary      Other Findings  post-pubertal.      Anesthesia Plan  ASA Score- 2     Anesthesia Type- IV sedation with anesthesia with ASA Monitors.         Additional Monitors:     Airway Plan:            Plan Factors-Exercise tolerance (METS): >4 METS.    Chart reviewed.    Patient summary reviewed.    Patient is not a current smoker.  Patient did not smoke on day of surgery.            Induction- intravenous.    Postoperative Plan- Plan for postoperative opioid use.     Informed Consent- Anesthetic plan and risks discussed with patient.  I personally reviewed this patient with the CRNA. Discussed and agreed on the Anesthesia Plan with the CRNA..

## 2024-01-08 NOTE — OP NOTE
OPERATIVE REPORT  PATIENT NAME: Delfina Pathak    :  1965  MRN: [id number]  Pt Location:  OR ROOM 01    SURGERY DATE: 2024    Surgeons and Role:     * Todd Negron MD - Primary    Preop Diagnosis:  Thickened endometrium [R93.89]    Post-Op Diagnosis Codes:     * Thickened endometrium [R93.89]    Procedure(s):  DILATATION AND CURETTAGE (D&C) WITH HYSTEROSCOPY. POSSIBLE POLYPECTOMY.    Specimen(s):  Endometrial curettage  Endometrial polyp    Estimated Blood Loss:   5cc    Drains:  None    Anesthesia Type:   IV Sedation with Anesthesia    Operative Indications:  Thickened endometrium [R93.89]    Operative Findings:  Normal external female genitalia  Normal cervix  Normal sized anteverted uterus  Endometrial polyp at fundus    Complications:   None    Procedure and Technique:  Patient was taken to the operating room. Monitored anesthesia care (MAC) was administered and the patient was positioned on the OR table in the dorsal lithotomy position. All pressure points were padded and a misty hugger was placed to maintain control of core body temperature. A bimanual exam was performed and the uterus was noted to be anteverted, normal in size and consistency with no palpable adnexal masses or fullness. The patient was prepped and draped in the usual sterile fashion.    A time out was performed to confirm correct patient and correct procedure. A Mccabe retractor was used to visualize the anterior lip of the cervix, which was then grasped with a Allis.     Hysteroscope was introduced under direct visualization using normal saline solution as the distention media. Hysteroscope was advanced to the uterine fundus and the entire uterine cavity was inspected in a systematic manner. There was noted to be endometrial polyp at the fundus. Symphion resection device was inserted to resect the polyp. Hysteroscope was withdrawn and sharp curetting was performed, starting at the 12'oclock position and rotating a total of 360  degrees to cover all surfaces. Endometrial tissue was obtained and sent for pathology. The allis was removed from the anterior lip of the cervix. Good hemostasis was confirmed. Instruments were then removed from the vagina.    At the conclusion of the procedure, all needle, sponge, and instrument counts were noted to be correct x2.     Dr. Negron was present and participated in all key portions of the case.    SIGNATURE: Minerva Johns MD  DATE: January 8, 2024  TIME: 11:55 AM

## 2024-01-08 NOTE — H&P
57-year-old female   Abnormal ultrasound elevated endometrial thickness 21 mm   uterovaginal prolapse third/fourth degree\respond to pessary   Had episodes of perimenopausal bleeding  Fibroid uterus  Pessary ring with support 4.  Plan  Will proceed with hysteroscopy D&C evaluation of the endometrium cavity  Diet/exercise  Calcium/vitamin-D  Mammogram  Up-to-date with colonoscopy  Return to office for annual exam   continue doing Kegel exercise           Subjective:       Patient ID: Delfina Pathak is a 58 y.o. female.     HPI  Patient seen evaluated present to the office today to discuss ultrasound result which was done secondary to postmenopausal bleeding        UTERUS:  The uterus is anteverted in position, measuring 10.8 x 5.3 x 6.0 cm.  Right fibroid measures 4.4 x 3.9 x 3.2 cm, prior measurement 4.2 x 3.4 x 3.5 cm.  Anterior intramural fibroid measures 3 x 2.9 x 3.3 cm. Prior measurement 3.3 x 2.9 x 2.9 cm. There is mass effect on the endometrium.  Small right lower fibroid measures 1.2 x 0.8 x 1.1 cm.  The cervix appears within normal limits.     ENDOMETRIUM:  The endometrial echo complex has an AP caliber of 21.0 mm.  Markedly thickened for a postmenopausal patient, with vascularity.     OVARIES/ADNEXA:  Ovaries are not well visualized.  No suspicious adnexal abnormality.     OTHER:  No free fluid or loculated fluid collections.        IMPRESSION:     1. Thickened endometrium with vascularity. Correlate clinically to differentiate hyperplasia from possible neoplasm.  2. Minimal change in uterine fibroids. Anterior fibroid has mass effect on the endometrium.  3. Ovaries are not well visualized.        Results reviewed and discussed with patient based on the endometrial thickness I would recommend to proceed with hysteroscopy D&C evaluation of the endometrium cavity patient has endometrial biopsy done in 2021 and came back benign  Procedure reviewed and discussed with patient risk of bleeding, infection, blood  "transfusion, perforation, need another surgery, risk of anesthesia all explained and discussed with patient all patient questions answered and patient was satisfied           The following portions of the patient's history were reviewed and updated as appropriate: allergies, current medications, past family history, past medical history, past social history, past surgical history and problem list.     Review of Systems       Objective:        /80 (BP Location: Left arm, Patient Position: Sitting, Cuff Size: Adult)   Ht 5' 4\" (1.626 m)   Wt 94.8 kg (209 lb)   LMP  (LMP Unknown)   BMI 35.87 kg/m²             Physical Exam  Constitutional:       Appearance: She is well-developed.   Cardiovascular:      Rate and Rhythm: Normal rate and regular rhythm.      Heart sounds: Normal heart sounds.   Pulmonary:      Effort: Pulmonary effort is normal.      Breath sounds: Normal breath sounds.   Abdominal:      General: There is no distension.      Palpations: Abdomen is soft.      Tenderness: There is no abdominal tenderness.   Musculoskeletal:      Right lower leg: No edema.      Left lower leg: No edema.   Neurological:      Mental Status: She is alert and oriented to person, place, and time.   Psychiatric:         Behavior: Behavior normal.      "

## 2024-01-10 PROCEDURE — 88305 TISSUE EXAM BY PATHOLOGIST: CPT | Performed by: STUDENT IN AN ORGANIZED HEALTH CARE EDUCATION/TRAINING PROGRAM

## 2024-01-25 ENCOUNTER — OFFICE VISIT (OUTPATIENT)
Dept: OBGYN CLINIC | Facility: CLINIC | Age: 59
End: 2024-01-25

## 2024-01-25 VITALS
BODY MASS INDEX: 35.2 KG/M2 | WEIGHT: 206.2 LBS | DIASTOLIC BLOOD PRESSURE: 78 MMHG | TEMPERATURE: 98 F | HEIGHT: 64 IN | SYSTOLIC BLOOD PRESSURE: 126 MMHG

## 2024-01-25 DIAGNOSIS — Z98.890 STATUS POST D&C: Primary | ICD-10-CM

## 2024-01-25 PROCEDURE — 99024 POSTOP FOLLOW-UP VISIT: CPT | Performed by: OBSTETRICS & GYNECOLOGY

## 2024-01-25 NOTE — PROGRESS NOTES
"Assessment/Plan:     Diagnoses and all orders for this visit:    Status post D&C          57-year-old female   Abnormal ultrasound elevated endometrial thickness 21 mm  Status post hysteroscopy D&C polypectomy benign pathology   uterovaginal prolapse third/fourth degree\respond to pessary   Had episodes of perimenopausal bleeding  Fibroid uterus  Pessary ring with support 4.  Plan  Diet/exercise  Calcium/vitamin-D  Mammogram  Up-to-date with colonoscopy   continue doing Kegel exercise  Return to office for annual exam  Subjective:      Patient ID: Delfina Pathak is a 58 y.o. female.    HPI  Patient seen evaluated present to the office today status post hysteroscopy D&C polypectomy  Denies any complaint today  Denies any pelvic pain  Complaining of vaginal spotting since procedure that improving  Denies any urgency frequency or dysuria  Pathology results reviewed and discussed with patient    The following portions of the patient's history were reviewed and updated as appropriate: allergies, current medications, past family history, past medical history, past social history, past surgical history and problem list.    Review of Systems      Objective:      /78 (BP Location: Left arm, Patient Position: Sitting, Cuff Size: Adult)   Temp 98 °F (36.7 °C)   Ht 5' 4\" (1.626 m)   Wt 93.5 kg (206 lb 3.2 oz)   LMP 12/21/2023 (Exact Date) Comment: irregular menses  BMI 35.39 kg/m²     A. Endometrium, curettage and polypectomy:   - Fragments of benign endometrial polyp.   - Background inactive endometrium.   - Fragments of endomyometrial junction.       Physical Exam  Constitutional:       Appearance: Normal appearance.   Neurological:      General: No focal deficit present.      Mental Status: She is alert and oriented to person, place, and time.   Psychiatric:         Mood and Affect: Mood normal.         Behavior: Behavior normal.         "

## 2024-03-19 DIAGNOSIS — F32.A DEPRESSION, CONTROLLED: ICD-10-CM

## 2024-03-19 RX ORDER — PAROXETINE HYDROCHLORIDE 20 MG/1
20 TABLET, FILM COATED ORAL DAILY
Qty: 90 TABLET | Refills: 2 | Status: SHIPPED | OUTPATIENT
Start: 2024-03-19

## 2024-04-01 ENCOUNTER — HOSPITAL ENCOUNTER (OUTPATIENT)
Dept: RADIOLOGY | Facility: HOSPITAL | Age: 59
Discharge: HOME | End: 2024-04-01
Attending: OBSTETRICS & GYNECOLOGY
Payer: COMMERCIAL

## 2024-04-01 VITALS — WEIGHT: 206 LBS | HEIGHT: 64 IN | BODY MASS INDEX: 35.17 KG/M2

## 2024-04-01 DIAGNOSIS — Z12.31 SCREENING MAMMOGRAM, ENCOUNTER FOR: ICD-10-CM

## 2024-04-01 PROCEDURE — 77067 SCR MAMMO BI INCL CAD: CPT

## 2024-04-01 PROCEDURE — 77063 BREAST TOMOSYNTHESIS BI: CPT

## 2024-04-10 ENCOUNTER — TELEPHONE (OUTPATIENT)
Age: 59
End: 2024-04-10

## 2024-04-10 NOTE — TELEPHONE ENCOUNTER
Patient is calling in to review the results of her mammogram. She is aware that her left breast needs further imaging with diagnostic mammogram and ultrasound. Phone number given for central scheduling

## 2024-05-28 ENCOUNTER — TELEPHONE (OUTPATIENT)
Age: 59
End: 2024-05-28

## 2024-05-28 DIAGNOSIS — F32.A DEPRESSION, CONTROLLED: ICD-10-CM

## 2024-05-29 ENCOUNTER — HOSPITAL ENCOUNTER (OUTPATIENT)
Dept: RADIOLOGY | Facility: HOSPITAL | Age: 59
Discharge: HOME | End: 2024-05-29
Attending: OBSTETRICS & GYNECOLOGY
Payer: COMMERCIAL

## 2024-05-29 VITALS — WEIGHT: 206 LBS | HEIGHT: 64 IN | BODY MASS INDEX: 35.17 KG/M2

## 2024-05-29 DIAGNOSIS — R92.8 ABNORMALITY OF LEFT BREAST ON SCREENING MAMMOGRAM: ICD-10-CM

## 2024-05-29 PROCEDURE — 76642 ULTRASOUND BREAST LIMITED: CPT

## 2024-05-29 PROCEDURE — G0279 TOMOSYNTHESIS, MAMMO: HCPCS

## 2024-05-29 PROCEDURE — 77065 DX MAMMO INCL CAD UNI: CPT

## 2024-05-30 NOTE — TELEPHONE ENCOUNTER
Labs sent: CMP, CBC, TSH, HbA1c and Lipid Panel.     Please call pt and advise her to complete the labs ordered.     Thanks.   Dr. Wolff

## 2024-06-20 ENCOUNTER — TELEPHONE (OUTPATIENT)
Age: 59
End: 2024-06-20

## 2024-06-20 NOTE — TELEPHONE ENCOUNTER
monserrat from phone number [phone] from pre encounter called and said patient needs a prior  auth for breast biopsy on Monday and the  phone number med review is  . She said we should keep in touch with pt and if not done will have to reschedule Monserrat would like a call back to check on  status  and patients appt is at 1030 at [facility] . Thank you

## 2024-06-24 ENCOUNTER — DOCUMENTATION (OUTPATIENT)
Dept: OBGYN CLINIC | Facility: CLINIC | Age: 59
End: 2024-06-24

## 2024-06-24 ENCOUNTER — HOSPITAL ENCOUNTER (OUTPATIENT)
Dept: RADIOLOGY | Facility: HOSPITAL | Age: 59
Discharge: HOME | End: 2024-06-24
Attending: OBSTETRICS & GYNECOLOGY

## 2024-06-24 ENCOUNTER — HOSPITAL ENCOUNTER (OUTPATIENT)
Dept: RADIOLOGY | Facility: HOSPITAL | Age: 59
Discharge: HOME | End: 2024-06-24

## 2024-06-24 VITALS — DIASTOLIC BLOOD PRESSURE: 82 MMHG | SYSTOLIC BLOOD PRESSURE: 128 MMHG

## 2024-06-24 DIAGNOSIS — R92.8 ABNORMAL MAMMOGRAM OF LEFT BREAST: ICD-10-CM

## 2024-06-24 LAB — HBA1C MFR BLD HPLC: 5.5 %

## 2024-06-24 RX ORDER — LIDOCAINE HYDROCHLORIDE 10 MG/ML
5 INJECTION, SOLUTION EPIDURAL; INFILTRATION; INTRACAUDAL; PERINEURAL ONCE
Status: DISCONTINUED | OUTPATIENT
Start: 2024-06-24 | End: 2024-06-25 | Stop reason: HOSPADM

## 2024-06-24 RX ORDER — LIDOCAINE HYDROCHLORIDE AND EPINEPHRINE 10; 10 MG/ML; UG/ML
10 INJECTION, SOLUTION INFILTRATION; PERINEURAL ONCE
Status: DISCONTINUED | OUTPATIENT
Start: 2024-06-24 | End: 2024-06-25 | Stop reason: HOSPADM

## 2024-06-24 NOTE — TELEPHONE ENCOUNTER
Spoke with insurance, Auth was already submitted last week currently in medical review. Case was expedited since test is being preformed today. Case # [id number]

## 2024-06-24 NOTE — LETTER
[facility] MAMMOGRAPHY  [address] [address]  Dept: [phone]    June 24, 2024     Patient: Delfina Pathak   YOB: 1965   Date of Visit: 6/24/2024       To Whom it May Concern:    Delfina Pathak is under my professional care. She was seen in the radiology department for a healthcare appointment. Please excuse her from any time this impacted her normal work hours/ employment requirements.    If you have any questions or concerns, please don't hesitate to call.         Sincerely,     Anna Fleming, RN Radiology Nursing Team  Bubba Goncalves MD Radiologist

## 2024-06-24 NOTE — LETTER
[facility] MAMMOGRAPHY  [address] [address]  Dept: [phone]    June 24, 2024     Patient: Delfina Pathak   YOB: 1965   Date of Visit: 6/24/2024       To Whom it May Concern:    Delfina Pathak is under my professional care. She was seen in the hospital for procedural care on 06/24/2024. She will have post-procedure restrictions for at least the first 24 hours following today's appointment, which may be extended an additional 24-48 hours depending on each individuals unique pain control and healing time. These restrictions include:     No heavy lifting of greater than 8-10 lbs to avoid increased risk of pain or bleeding  Avoid any trauma, bumping, or injury to the chest/abdominal region  No aerobic or strenuous activities  No repetitive motions of reaching or overhead extension/ stretching      If you have any questions or concerns, please don't hesitate to call.    Sincerely,     Bubba Goncalves MD, Radiologist  Anna Chan RN, BSN / Pat Pettit PCA Radiology Nursing Team  Bala SARGENT, RT Radiology Manager

## 2024-07-03 ENCOUNTER — TELEPHONE (OUTPATIENT)
Age: 59
End: 2024-07-03

## 2024-07-15 ENCOUNTER — HOSPITAL ENCOUNTER (OUTPATIENT)
Dept: MAMMOGRAPHY | Facility: CLINIC | Age: 59
Discharge: HOME | End: 2024-07-15

## 2024-07-15 VITALS — HEART RATE: 82 BPM | SYSTOLIC BLOOD PRESSURE: 130 MMHG | DIASTOLIC BLOOD PRESSURE: 87 MMHG

## 2024-07-15 DIAGNOSIS — R92.8 ABNORMAL MAMMOGRAM OF LEFT BREAST: ICD-10-CM

## 2024-07-15 RX ORDER — LIDOCAINE HYDROCHLORIDE AND EPINEPHRINE BITARTRATE 20; .01 MG/ML; MG/ML
10 INJECTION, SOLUTION SUBCUTANEOUS ONCE
Status: DISCONTINUED | OUTPATIENT
Start: 2024-07-15 | End: 2024-07-16 | Stop reason: HOSPADM

## 2024-07-15 RX ORDER — LIDOCAINE HYDROCHLORIDE 10 MG/ML
5 INJECTION, SOLUTION EPIDURAL; INFILTRATION; INTRACAUDAL; PERINEURAL ONCE
Status: DISCONTINUED | OUTPATIENT
Start: 2024-07-15 | End: 2024-07-16 | Stop reason: HOSPADM

## 2024-07-17 ENCOUNTER — TELEPHONE (OUTPATIENT)
Age: 59
End: 2024-07-17

## 2024-07-17 DIAGNOSIS — R92.8 ABNORMAL MAMMOGRAM: Primary | ICD-10-CM

## 2024-07-17 NOTE — TELEPHONE ENCOUNTER
----- Message from Todd Negron MD sent at 7/17/2024 12:32 PM EDT -----  Fatty breast tissue noted, reassuring mammogram some asymmetry noted on the left breast recommendation diagnostic mammogram in 6 months of the left breast  
LVM requesting a return call to discuss results of mammogram.   
Hemoglobin levels:  Hemoglobin: 10.8 g/dL <L> [11.5 - 15.5] (08-02-20)  Hemoglobin: 11.6 g/dL [11.5 - 15.5] (08-01-20)  Hemoglobin: 10.6 g/dL <L> [11.5 - 15.5] (07-31-20)  Hemoglobin: 10.4 g/dL <L> [11.5 - 15.5] (07-30-20)  Hemoglobin: 9.5 g/dL <L> [11.5 - 15.5] (07-30-20)  Hemoglobin: 6.8 g/dL <LL> [11.5 - 15.5] (07-29-20)    H&P- the CT from today inpatient:  < from: CT Angio Abdomen and Pelvis w/ IV Cont (07.29.20 @ 22:18) >  1. Oral contrast on the left-sided colon limits the ability to evaluate for active bleeding.  2. Large pelvic tumor, significantly increased in size. The tumor appears to invade and obstruct the rectosigmoid colon. At the site of invasion, there may be a small amount of active bleeding.     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  Recommendation: Hb 6.8 gm/dL  CT:  Large pelvic tumor, significantly increased in size. The tumor appears to invade and obstruct the rectosigmoid colon. At the site of invasion, there may be a small amount of active bleeding.    D/C note- PRINCIPAL DISCHARGE DIAGNOSIS  Diagnosis: Anemia    Please specify the etiology of anemia    1) Anemia secondary to metastatic rectal CA  2) Anemia secondary to endometrial CA  3) Other  4) Not clinically significant

## 2024-09-12 ENCOUNTER — OFFICE VISIT (OUTPATIENT)
Age: 59
End: 2024-09-12

## 2024-09-12 VITALS
HEART RATE: 93 BPM | WEIGHT: 209 LBS | RESPIRATION RATE: 16 BRPM | HEIGHT: 64 IN | SYSTOLIC BLOOD PRESSURE: 125 MMHG | TEMPERATURE: 98 F | DIASTOLIC BLOOD PRESSURE: 81 MMHG | OXYGEN SATURATION: 99 % | BODY MASS INDEX: 35.68 KG/M2

## 2024-09-12 DIAGNOSIS — R00.0 TACHYCARDIA: ICD-10-CM

## 2024-09-12 DIAGNOSIS — Z00.00 ANNUAL PHYSICAL EXAM: Primary | ICD-10-CM

## 2024-09-12 PROCEDURE — 99386 PREV VISIT NEW AGE 40-64: CPT | Performed by: FAMILY MEDICINE

## 2024-09-12 NOTE — PROGRESS NOTES
Adult Annual Physical  Name: Delfina Pathak      : 1965      MRN: [id number]  Encounter Provider: Jackelyn Khan MD  Encounter Date: 2024   Encounter department: [facility]    Assessment & Plan  Annual physical exam         Tachycardia  Patient works at [employer]   She notes that when she is at work she feels her heart racing  Denies nausea  Sometimes feels gas pressure which she feels like belching  Notes that she believe this should excuse her from work    Plan  Education provided  No evidence of tachycardia in office, however may benefit from Holter monitor  Cardiology referral placed  Orders:    Ambulatory Referral to Cardiology; Future      Immunizations and preventive care screenings were discussed with patient today. Appropriate education was printed on patient's after visit summary.    Counseling:  Alcohol/drug use: discussed moderation in alcohol intake, the recommendations for healthy alcohol use, and avoidance of illicit drug use.  Dental Health: discussed importance of regular tooth brushing, flossing, and dental visits.  Injury prevention: discussed safety/seat belts, safety helmets, smoke detectors, carbon dioxide detectors, and smoking near bedding or upholstery.  Sexual health: discussed sexually transmitted diseases, partner selection, use of condoms, avoidance of unintended pregnancy, and contraceptive alternatives.  Exercise: the importance of regular exercise/physical activity was discussed. Recommend exercise 3-5 times per week for at least 30 minutes.     BMI Counseling: Body mass index is 35.87 kg/m². The BMI is above normal. Nutrition recommendations include decreasing portion sizes and encouraging healthy choices of fruits and vegetables. Exercise recommendations include exercising 3-5 times per week and strength training exercises. Rationale for BMI follow-up plan is due to patient being overweight or obese.     Depression  Screening and Follow-up Plan: Patient's depression screening was positive with a PHQ-9 score of 11. Continue regular follow-up with their mental health provider who is managing their mental health condition(s). Patient advised to follow-up with PCP for further management.         History of Present Illness     Adult Annual Physical:  Patient presents for annual physical.     Diet and Physical Activity:  - Diet/Nutrition: consuming 3-5 servings of fruits/vegetables daily and well balanced diet.  - Exercise: no formal exercise. does stretches at home    Depression Screening:    - PHQ-9 Score: 11    General Health:  - Sleep: sleeps poorly and 7-8 hours of sleep on average.  - Hearing: normal hearing bilateral ears.  - Vision: wears glasses and goes for regular eye exams.  - Dental: regular dental visits and brushes teeth twice daily.    /GYN Health:  - Follows with GYN: yes.   - Menopause: postmenopausal.   - History of STDs: no    Review of Systems   Constitutional:  Negative for chills and fever.   HENT:  Negative for ear pain and sore throat.    Eyes:  Negative for pain and visual disturbance.   Respiratory:  Positive for chest tightness. Negative for cough and shortness of breath.    Cardiovascular:  Negative for chest pain and palpitations.   Gastrointestinal:  Negative for abdominal pain, constipation, diarrhea, nausea and vomiting.   Genitourinary:  Negative for dysuria and hematuria.   Musculoskeletal:  Negative for arthralgias and back pain.   Skin:  Negative for color change and rash.   Neurological:  Negative for seizures and syncope.   All other systems reviewed and are negative.    Medical History Reviewed by provider this encounter:  Tobacco  Allergies  Meds  Problems  Med Hx  Surg Hx  Fam Hx       Current Outpatient Medications on File Prior to Visit   Medication Sig Dispense Refill    PARoxetine (PAXIL) 20 mg tablet Take 1 tablet (20 mg total) by mouth daily 90 tablet 2    [DISCONTINUED]  "imiquimod (ALDARA) 5 % cream Apply 1 packet topically 3 (three) times a week (Patient not taking: Reported on 1/8/2024) 12 each 0    [DISCONTINUED] meloxicam (MOBIC) 15 mg tablet Take 1 tablet (15 mg total) by mouth daily for 15 days (Patient not taking: Reported on 1/8/2024) 15 tablet 0    [DISCONTINUED] ondansetron (ZOFRAN-ODT) 4 mg disintegrating tablet Take 1 tablet (4 mg total) by mouth every 8 (eight) hours as needed for nausea or vomiting (Patient not taking: Reported on 1/8/2024) 10 tablet 0     No current facility-administered medications on file prior to visit.      Social History     Tobacco Use    Smoking status: Never    Smokeless tobacco: Never   Vaping Use    Vaping status: Never Used   Substance and Sexual Activity    Alcohol use: No     Comment: socially    Drug use: Not Currently     Types: Marijuana     Comment: hx marijuana use-quit 1990    Sexual activity: Yes     Partners: Male     Birth control/protection: None, Post-menopausal       Objective     /81 (BP Location: Left arm, Patient Position: Sitting, Cuff Size: Adult)   Pulse 93   Temp 98 °F (36.7 °C) (Tympanic)   Resp 16   Ht 5' 4\" (1.626 m)   Wt 94.8 kg (209 lb)   SpO2 99%   BMI 35.87 kg/m²     Physical Exam  Vitals and nursing note reviewed.   Constitutional:       General: She is not in acute distress.     Appearance: She is well-developed.   HENT:      Head: Normocephalic and atraumatic.   Eyes:      Conjunctiva/sclera: Conjunctivae normal.   Cardiovascular:      Rate and Rhythm: Normal rate and regular rhythm.      Heart sounds: No murmur heard.  Pulmonary:      Effort: Pulmonary effort is normal. No respiratory distress.      Breath sounds: Normal breath sounds.   Abdominal:      Palpations: Abdomen is soft.      Tenderness: There is no abdominal tenderness.   Musculoskeletal:         General: No swelling.      Cervical back: Neck supple.   Skin:     General: Skin is warm and dry.      Capillary Refill: Capillary refill " takes less than 2 seconds.   Neurological:      Mental Status: She is alert.   Psychiatric:         Mood and Affect: Mood normal.

## 2024-09-12 NOTE — PATIENT INSTRUCTIONS
"Patient Education     Routine physical for adults   The Basics   Written by the doctors and editors at [facility]   What is a physical? -- A physical is a routine visit, or \"check-up,\" with your doctor. You might also hear it called a \"wellness visit\" or \"preventive visit.\"  During each visit, the doctor will:   Ask about your physical and mental health   Ask about your habits, behaviors, and lifestyle   Do an exam   Give you vaccines if needed   Talk to you about any medicines you take   Give advice about your health   Answer your questions  Getting regular check-ups is an important part of taking care of your health. It can help your doctor find and treat any problems you have. But it's also important for preventing health problems.  A routine physical is different from a \"sick visit.\" A sick visit is when you see a doctor because of a health concern or problem. Since physicals are scheduled ahead of time, you can think about what you want to ask the doctor.  How often should I get a physical? -- It depends on your age and health. In general, for people age 21 years and older:   If you are younger than 50 years, you might be able to get a physical every 3 years.   If you are 50 years or older, your doctor might recommend a physical every year.  If you have an ongoing health condition, like diabetes or high blood pressure, your doctor will probably want to see you more often.  What happens during a physical? -- In general, each visit will include:   Physical exam - The doctor or nurse will check your height, weight, heart rate, and blood pressure. They will also look at your eyes and ears. They will ask about how you are feeling and whether you have any symptoms that bother you.   Medicines - It's a good idea to bring a list of all the medicines you take to each doctor visit. Your doctor will talk to you about your medicines and answer any questions. Tell them if you are having any side effects that bother you. You " "should also tell them if you are having trouble paying for any of your medicines.   Habits and behaviors - This includes:   Your diet   Your exercise habits   Whether you smoke, drink alcohol, or use drugs   Whether you are sexually active   Whether you feel safe at home  Your doctor will talk to you about things you can do to improve your health and lower your risk of health problems. They will also offer help and support. For example, if you want to quit smoking, they can give you advice and might prescribe medicines. If you want to improve your diet or get more physical activity, they can help you with this, too.   Lab tests, if needed - The tests you get will depend on your age and situation. For example, your doctor might want to check your:   Cholesterol   Blood sugar   Iron level   Vaccines - The recommended vaccines will depend on your age, health, and what vaccines you already had. Vaccines are very important because they can prevent certain serious or deadly infections.   Discussion of screening - \"Screening\" means checking for diseases or other health problems before they cause symptoms. Your doctor can recommend screening based on your age, risk, and preferences. This might include tests to check for:   Cancer, such as breast, prostate, cervical, ovarian, colorectal, prostate, lung, or skin cancer   Sexually transmitted infections, such as chlamydia and gonorrhea   Mental health conditions like depression and anxiety  Your doctor will talk to you about the different types of screening tests. They can help you decide which screenings to have. They can also explain what the results might mean.   Answering questions - The physical is a good time to ask the doctor or nurse questions about your health. If needed, they can refer you to other doctors or specialists, too.  Adults older than 65 years often need other care, too. As you get older, your doctor will talk to you about:   How to prevent falling at " home   Hearing or vision tests   Memory testing   How to take your medicines safely   Making sure that you have the help and support you need at home  All topics are updated as new evidence becomes available and our peer review process is complete.  This topic retrieved from [employer] on: May 02, 2024.  Topic 416320 Version 1.0  Release: 32.4.3 - C32.122  © 2024 UpToDate, Inc. and/or its affiliates. All rights reserved.  Consumer Information Use and Disclaimer   Disclaimer: This generalized information is a limited summary of diagnosis, treatment, and/or medication information. It is not meant to be comprehensive and should be used as a tool to help the user understand and/or assess potential diagnostic and treatment options. It does NOT include all information about conditions, treatments, medications, side effects, or risks that may apply to a specific patient. It is not intended to be medical advice or a substitute for the medical advice, diagnosis, or treatment of a health care provider based on the health care provider's examination and assessment of a patient's specific and unique circumstances. Patients must speak with a health care provider for complete information about their health, medical questions, and treatment options, including any risks or benefits regarding use of medications. This information does not endorse any treatments or medications as safe, effective, or approved for treating a specific patient. UpToDate, Inc. and its affiliates disclaim any warranty or liability relating to this information or the use thereof.The use of this information is governed by the Terms of Use, available at https://www.woltersAppstarteruwer.com/en/know/clinical-effectiveness-terms. 2024© UpToDate, Inc. and its affiliates and/or licensors. All rights reserved.  Copyright   © 2024 UpToDate, Inc. and/or its affiliates. All rights reserved.    Patient Education     Routine physical for adults   The Basics   Written by the  "doctors and editors at [facility]   What is a physical? -- A physical is a routine visit, or \"check-up,\" with your doctor. You might also hear it called a \"wellness visit\" or \"preventive visit.\"  During each visit, the doctor will:   Ask about your physical and mental health   Ask about your habits, behaviors, and lifestyle   Do an exam   Give you vaccines if needed   Talk to you about any medicines you take   Give advice about your health   Answer your questions  Getting regular check-ups is an important part of taking care of your health. It can help your doctor find and treat any problems you have. But it's also important for preventing health problems.  A routine physical is different from a \"sick visit.\" A sick visit is when you see a doctor because of a health concern or problem. Since physicals are scheduled ahead of time, you can think about what you want to ask the doctor.  How often should I get a physical? -- It depends on your age and health. In general, for people age 21 years and older:   If you are younger than 50 years, you might be able to get a physical every 3 years.   If you are 50 years or older, your doctor might recommend a physical every year.  If you have an ongoing health condition, like diabetes or high blood pressure, your doctor will probably want to see you more often.  What happens during a physical? -- In general, each visit will include:   Physical exam - The doctor or nurse will check your height, weight, heart rate, and blood pressure. They will also look at your eyes and ears. They will ask about how you are feeling and whether you have any symptoms that bother you.   Medicines - It's a good idea to bring a list of all the medicines you take to each doctor visit. Your doctor will talk to you about your medicines and answer any questions. Tell them if you are having any side effects that bother you. You should also tell them if you are having trouble paying for any of your " "medicines.   Habits and behaviors - This includes:   Your diet   Your exercise habits   Whether you smoke, drink alcohol, or use drugs   Whether you are sexually active   Whether you feel safe at home  Your doctor will talk to you about things you can do to improve your health and lower your risk of health problems. They will also offer help and support. For example, if you want to quit smoking, they can give you advice and might prescribe medicines. If you want to improve your diet or get more physical activity, they can help you with this, too.   Lab tests, if needed - The tests you get will depend on your age and situation. For example, your doctor might want to check your:   Cholesterol   Blood sugar   Iron level   Vaccines - The recommended vaccines will depend on your age, health, and what vaccines you already had. Vaccines are very important because they can prevent certain serious or deadly infections.   Discussion of screening - \"Screening\" means checking for diseases or other health problems before they cause symptoms. Your doctor can recommend screening based on your age, risk, and preferences. This might include tests to check for:   Cancer, such as breast, prostate, cervical, ovarian, colorectal, prostate, lung, or skin cancer   Sexually transmitted infections, such as chlamydia and gonorrhea   Mental health conditions like depression and anxiety  Your doctor will talk to you about the different types of screening tests. They can help you decide which screenings to have. They can also explain what the results might mean.   Answering questions - The physical is a good time to ask the doctor or nurse questions about your health. If needed, they can refer you to other doctors or specialists, too.  Adults older than 65 years often need other care, too. As you get older, your doctor will talk to you about:   How to prevent falling at home   Hearing or vision tests   Memory testing   How to take your " medicines safely   Making sure that you have the help and support you need at home  All topics are updated as new evidence becomes available and our peer review process is complete.  This topic retrieved from [employer] on: May 02, 2024.  Topic 670439 Version 1.0  Release: 32.4.3 - C32.122  © 2024 UpToDate, Inc. and/or its affiliates. All rights reserved.  Consumer Information Use and Disclaimer   Disclaimer: This generalized information is a limited summary of diagnosis, treatment, and/or medication information. It is not meant to be comprehensive and should be used as a tool to help the user understand and/or assess potential diagnostic and treatment options. It does NOT include all information about conditions, treatments, medications, side effects, or risks that may apply to a specific patient. It is not intended to be medical advice or a substitute for the medical advice, diagnosis, or treatment of a health care provider based on the health care provider's examination and assessment of a patient's specific and unique circumstances. Patients must speak with a health care provider for complete information about their health, medical questions, and treatment options, including any risks or benefits regarding use of medications. This information does not endorse any treatments or medications as safe, effective, or approved for treating a specific patient. UpToDate, Inc. and its affiliates disclaim any warranty or liability relating to this information or the use thereof.The use of this information is governed by the Terms of Use, available at https://www.woltersMclowduwer.com/en/know/clinical-effectiveness-terms. 2024© UpToDate, Inc. and its affiliates and/or licensors. All rights reserved.  Copyright   © 2024 UpToDate, Inc. and/or its affiliates. All rights reserved.

## 2024-09-23 ENCOUNTER — TELEPHONE (OUTPATIENT)
Age: 59
End: 2024-09-23

## 2024-09-23 NOTE — TELEPHONE ENCOUNTER
Hi Dr. Lomeli,    [facility] contacted us, this  patient was there today and they stated that we never provided results from bloodwork we requested and order in May.   [facility] said that they sent over the results on 6/25/24.  I see lab work in our system, but on our end it looks like it was not completed.  They stated that the results can be resent from client services: [phone]      Please advise.

## 2024-09-28 ENCOUNTER — HOSPITAL ENCOUNTER (EMERGENCY)
Facility: HOSPITAL | Age: 59
Discharge: HOME | End: 2024-09-28
Attending: EMERGENCY MEDICINE
Payer: COMMERCIAL

## 2024-09-28 ENCOUNTER — APPOINTMENT (EMERGENCY)
Dept: RADIOLOGY | Facility: HOSPITAL | Age: 59
End: 2024-09-28
Payer: COMMERCIAL

## 2024-09-28 VITALS
OXYGEN SATURATION: 98 % | SYSTOLIC BLOOD PRESSURE: 163 MMHG | RESPIRATION RATE: 20 BRPM | TEMPERATURE: 98.3 F | HEART RATE: 76 BPM | DIASTOLIC BLOOD PRESSURE: 91 MMHG

## 2024-09-28 DIAGNOSIS — R10.9 ABDOMINAL PAIN: Primary | ICD-10-CM

## 2024-09-28 LAB
ALBUMIN SERPL BCG-MCNC: 4.2 G/DL (ref 3.5–5)
ALP SERPL-CCNC: 66 U/L (ref 34–104)
ALT SERPL W P-5'-P-CCNC: 20 U/L (ref 7–52)
ANION GAP SERPL CALCULATED.3IONS-SCNC: 6 MMOL/L (ref 4–13)
AST SERPL W P-5'-P-CCNC: 21 U/L (ref 13–39)
BACTERIA UR QL AUTO: ABNORMAL /HPF
BASOPHILS # BLD AUTO: 0.04 THOUSANDS/ΜL (ref 0–0.1)
BASOPHILS NFR BLD AUTO: 1 % (ref 0–1)
BILIRUB SERPL-MCNC: 0.45 MG/DL (ref 0.2–1)
BILIRUB UR QL STRIP: NEGATIVE
BUN SERPL-MCNC: 14 MG/DL (ref 5–25)
CALCIUM SERPL-MCNC: 9.1 MG/DL (ref 8.4–10.2)
CHLORIDE SERPL-SCNC: 104 MMOL/L (ref 96–108)
CLARITY UR: ABNORMAL
CO2 SERPL-SCNC: 28 MMOL/L (ref 21–32)
COLOR UR: YELLOW
CREAT SERPL-MCNC: 0.71 MG/DL (ref 0.6–1.3)
EOSINOPHIL # BLD AUTO: 0.12 THOUSAND/ΜL (ref 0–0.61)
EOSINOPHIL NFR BLD AUTO: 2 % (ref 0–6)
ERYTHROCYTE [DISTWIDTH] IN BLOOD BY AUTOMATED COUNT: 14.5 % (ref 11.6–15.1)
GFR SERPL CREATININE-BSD FRML MDRD: 94 ML/MIN/1.73SQ M
GLUCOSE SERPL-MCNC: 86 MG/DL (ref 65–140)
GLUCOSE UR STRIP-MCNC: NEGATIVE MG/DL
HCT VFR BLD AUTO: 39.5 % (ref 34.8–46.1)
HGB BLD-MCNC: 12.2 G/DL (ref 11.5–15.4)
HGB UR QL STRIP.AUTO: ABNORMAL
IMM GRANULOCYTES # BLD AUTO: 0.03 THOUSAND/UL (ref 0–0.2)
IMM GRANULOCYTES NFR BLD AUTO: 0 % (ref 0–2)
KETONES UR STRIP-MCNC: NEGATIVE MG/DL
LEUKOCYTE ESTERASE UR QL STRIP: NEGATIVE
LIPASE SERPL-CCNC: 9 U/L (ref 11–82)
LYMPHOCYTES # BLD AUTO: 3.36 THOUSANDS/ΜL (ref 0.6–4.47)
LYMPHOCYTES NFR BLD AUTO: 43 % (ref 14–44)
MCH RBC QN AUTO: 26.9 PG (ref 26.8–34.3)
MCHC RBC AUTO-ENTMCNC: 30.9 G/DL (ref 31.4–37.4)
MCV RBC AUTO: 87 FL (ref 82–98)
MONOCYTES # BLD AUTO: 0.49 THOUSAND/ΜL (ref 0.17–1.22)
MONOCYTES NFR BLD AUTO: 6 % (ref 4–12)
MUCOUS THREADS UR QL AUTO: ABNORMAL
NEUTROPHILS # BLD AUTO: 3.8 THOUSANDS/ΜL (ref 1.85–7.62)
NEUTS SEG NFR BLD AUTO: 48 % (ref 43–75)
NITRITE UR QL STRIP: NEGATIVE
NON-SQ EPI CELLS URNS QL MICRO: ABNORMAL /HPF
NRBC BLD AUTO-RTO: 0 /100 WBCS
PH UR STRIP.AUTO: 6 [PH]
PLATELET # BLD AUTO: 267 THOUSANDS/UL (ref 149–390)
PMV BLD AUTO: 10.6 FL (ref 8.9–12.7)
POTASSIUM SERPL-SCNC: 3.8 MMOL/L (ref 3.5–5.3)
PROT SERPL-MCNC: 7.5 G/DL (ref 6.4–8.4)
PROT UR STRIP-MCNC: NEGATIVE MG/DL
RBC # BLD AUTO: 4.53 MILLION/UL (ref 3.81–5.12)
RBC #/AREA URNS AUTO: ABNORMAL /HPF
SODIUM SERPL-SCNC: 138 MMOL/L (ref 135–147)
SP GR UR STRIP.AUTO: 1.02 (ref 1–1.03)
UROBILINOGEN UR STRIP-ACNC: <2 MG/DL
WBC # BLD AUTO: 7.84 THOUSAND/UL (ref 4.31–10.16)
WBC #/AREA URNS AUTO: ABNORMAL /HPF

## 2024-09-28 PROCEDURE — 36415 COLL VENOUS BLD VENIPUNCTURE: CPT | Performed by: EMERGENCY MEDICINE

## 2024-09-28 PROCEDURE — 74177 CT ABD & PELVIS W/CONTRAST: CPT

## 2024-09-28 PROCEDURE — 85025 COMPLETE CBC W/AUTO DIFF WBC: CPT | Performed by: EMERGENCY MEDICINE

## 2024-09-28 PROCEDURE — 87086 URINE CULTURE/COLONY COUNT: CPT | Performed by: EMERGENCY MEDICINE

## 2024-09-28 PROCEDURE — 83690 ASSAY OF LIPASE: CPT | Performed by: EMERGENCY MEDICINE

## 2024-09-28 PROCEDURE — 80053 COMPREHEN METABOLIC PANEL: CPT | Performed by: EMERGENCY MEDICINE

## 2024-09-28 PROCEDURE — 81001 URINALYSIS AUTO W/SCOPE: CPT | Performed by: EMERGENCY MEDICINE

## 2024-09-28 PROCEDURE — 99284 EMERGENCY DEPT VISIT MOD MDM: CPT

## 2024-09-28 PROCEDURE — 99285 EMERGENCY DEPT VISIT HI MDM: CPT | Performed by: EMERGENCY MEDICINE

## 2024-09-28 PROCEDURE — 96374 THER/PROPH/DIAG INJ IV PUSH: CPT

## 2024-09-28 RX ORDER — KETOROLAC TROMETHAMINE 30 MG/ML
15 INJECTION, SOLUTION INTRAMUSCULAR; INTRAVENOUS ONCE
Status: COMPLETED | OUTPATIENT
Start: 2024-09-28 | End: 2024-09-28

## 2024-09-28 RX ADMIN — IOHEXOL 100 ML: 350 INJECTION, SOLUTION INTRAVENOUS at 15:04

## 2024-09-28 RX ADMIN — KETOROLAC TROMETHAMINE 15 MG: 30 INJECTION, SOLUTION INTRAMUSCULAR; INTRAVENOUS at 13:43

## 2024-09-28 NOTE — DISCHARGE INSTRUCTIONS
Follow-up with primary care for further care. Contact info provided below if needed.  Use over the counter medications as stated on the bottle as needed for pain control.  - Tylenol  - Ibuprofen  - Lidocaine patches  Return to the ED with new or worsening symptoms.

## 2024-09-28 NOTE — Clinical Note
Delfina Pathak was seen and treated in our emergency department on 9/28/2024.                Diagnosis:     Delfina  may return to work on return date.    She may return on this date: 10/02/2024         If you have any questions or concerns, please don't hesitate to call.      Sarah Beth Kumari MD    ______________________________           _______________          _______________  Hospital Representative                              Date                                Time

## 2024-09-28 NOTE — ED PROVIDER NOTES
Final diagnoses:   Abdominal pain     ED Disposition       ED Disposition   Discharge    Condition   Stable    Date/Time   Sat Sep 28, 2024  4:08 PM    Comment   Delfina Pathak discharge to home/self care.                   Assessment & Plan       Medical Decision Making  Pt is a 59yo F who presents with abdominal pain.     Differential diagnosis to include but not limited to cholelithiasis, cholecystitis, pyelonephritis, nephrolithiasis, muscular strain.  Will plan for labs and imaging.  Will treat symptomatically and reassess.  See ED course for results and details.    Plan to discharge pt with f/u to PCP. Discussed returning the ED with new or worsening of symptoms. Discussed use of over the counter medications as stated on the bottle as needed for pain. Pt expressed understanding of discharge instructions, return precautions, and medication instructions and is stable for discharge at this time. All questions were answered and pt was discharged without incident.       Amount and/or Complexity of Data Reviewed  Labs: ordered. Decision-making details documented in ED Course.  Radiology: ordered. Decision-making details documented in ED Course.    Risk  Prescription drug management.        ED Course as of 09/28/24 1624   Sat Sep 28, 2024   1346 CBC and differential(!)  Reviewed and without actionable derangement.    1404 Comprehensive metabolic panel  Reviewed and without actionable derangement.    1404 LIPASE(!): 9  Negative.    1524 Leukocytes, UA: Negative   1524 Nitrite, UA: Negative   1524 Blood, UA(!): Trace   1537 Epithelial Cells(!): Moderate  Dirty sample. Leuk and nitrite negative. Will send for culture but will not empirically treat.    1547 CT abdomen pelvis with contrast  Small volume free fluid within the cul-de-sac. Origin not evident.  - Likely physiologic  Multiple rounded hypodensities within the uterus with mild changes compared to 2023, presumably representing leiomyomata.   1607 Pt reassessed and  resting comfortably. Pt made aware of all results and plan for DC.        Medications   ketorolac (TORADOL) injection 15 mg (15 mg Intravenous Given 9/28/24 1343)   iohexol (OMNIPAQUE) 350 MG/ML injection (SINGLE-DOSE) 100 mL (100 mL Intravenous Given 9/28/24 1504)       ED Risk Strat Scores                           SBIRT 20yo+      Flowsheet Row Most Recent Value   Initial Alcohol Screen: US AUDIT-C     1. How often do you have a drink containing alcohol? 0 Filed at: 09/28/2024 1525   2. How many drinks containing alcohol do you have on a typical day you are drinking?  0 Filed at: 09/28/2024 1525   3a. Male UNDER 65: How often do you have five or more drinks on one occasion? 0 Filed at: 09/28/2024 1525   3b. FEMALE Any Age, or MALE 65+: How often do you have 4 or more drinks on one occassion? 0 Filed at: 09/28/2024 1525   Audit-C Score 0 Filed at: 09/28/2024 1525   MARK: How many times in the past year have you...    Used an illegal drug or used a prescription medication for non-medical reasons? Never Filed at: 09/28/2024 1525                            History of Present Illness       Chief Complaint   Patient presents with    Abdominal Pain     RUQ pain with radiation to R back for a couple of weeks, no vomiting, no diarrhea       Past Medical History:   Diagnosis Date    Anxiety     Closed fracture of left lateral malleolus     last assessed - 27Jan2014    Depression     Seasonal allergies       Past Surgical History:   Procedure Laterality Date    COLONOSCOPY      [place] HYSTEROSCOPY BX ENDOMETRIUM&/POLYPC W/WO D&C N/A 1/8/2024    Procedure: DILATATION AND CURETTAGE (D&C) WITH HYSTEROSCOPY,  POLYPECTOMY.;  Surgeon: Todd Negron MD;  Location:  MAIN OR;  Service: Gynecology    WISDOM TOOTH EXTRACTION        Family History   Problem Relation Age of Onset    Arthritis Family     Hypertension Mother     Heart attack Father     No Known Problems Sister     No Known Problems Daughter     No Known Problems Daughter      No Known Problems Maternal Aunt     No Known Problems Maternal Aunt     No Known Problems Paternal Aunt       Social History     Tobacco Use    Smoking status: Never    Smokeless tobacco: Never   Vaping Use    Vaping status: Never Used   Substance Use Topics    Alcohol use: No     Comment: socially    Drug use: Not Currently     Types: Marijuana     Comment: hx marijuana use-quit 1990      E-Cigarette/Vaping    E-Cigarette Use Never User       E-Cigarette/Vaping Substances    Nicotine No     THC No     CBD No     Flavoring No     Other No       I have reviewed and agree with the history as documented.     Pt is a 57yo F who presents for abdominal pain.  Patient reports several weeks ago she began with right-sided abdominal pain that radiates to her right back.  Patient reports over the past week it has been a worse than it was previously.  Patient denies any associated nausea or vomiting.  Patient denies any urinary or bowel symptoms.  Patient denies any fevers or chills.  Patient states it is not worsened with eating.  Patient reports it is worsened with movement.  Patient denies any trauma or injury to the area.  Patient denies any history of any abdominal surgeries.        Objective       ED Triage Vitals [09/28/24 1312]   Temperature Pulse Blood Pressure Respirations SpO2 Patient Position - Orthostatic VS   98.3 °F (36.8 °C) 86 132/82 (!) 24 97 % Sitting      Temp Source Heart Rate Source BP Location FiO2 (%) Pain Score    Tympanic Monitor Right arm -- 8      Vitals      Date and Time Temp Pulse SpO2 Resp BP Pain Score FACES Pain Rating User   09/28/24 1514 -- 76 98 % 20 163/91 -- -- DD   09/28/24 1343 -- -- -- -- -- 8 -- AG   09/28/24 1312 98.3 °F (36.8 °C) 86 97 % 24 132/82 8 -- LS            Physical Exam  Vitals reviewed.   Constitutional:       General: She is not in acute distress.     Appearance: She is well-developed. She is not toxic-appearing or diaphoretic.   HENT:      Head: Normocephalic and  atraumatic.      Right Ear: External ear normal.      Left Ear: External ear normal.      Nose: Nose normal.      Mouth/Throat:      Pharynx: Oropharynx is clear.   Eyes:      Extraocular Movements: Extraocular movements intact.      Pupils: Pupils are equal, round, and reactive to light.   Cardiovascular:      Rate and Rhythm: Normal rate and regular rhythm.      Heart sounds: Normal heart sounds.   Pulmonary:      Effort: Pulmonary effort is normal.      Breath sounds: Normal breath sounds.   Abdominal:      General: Bowel sounds are normal. There is no distension.      Palpations: Abdomen is soft.      Tenderness: There is no abdominal tenderness. There is no right CVA tenderness, left CVA tenderness, guarding or rebound.   Musculoskeletal:         General: Normal range of motion.      Cervical back: Normal range of motion and neck supple.      Right lower leg: No edema.      Left lower leg: No edema.   Skin:     General: Skin is warm and dry.      Capillary Refill: Capillary refill takes less than 2 seconds.      Coloration: Skin is not pale.      Findings: No erythema or rash.   Neurological:      General: No focal deficit present.      Mental Status: She is alert and oriented to person, place, and time.   Psychiatric:         Speech: Speech normal.         Behavior: Behavior is cooperative.         Results Reviewed       Procedure Component Value Units Date/Time    Urine culture [[id number]] Collected: 09/28/24 1541    Lab Status: In process Specimen: Urine, Clean Catch Updated: 09/28/24 1543    Urine Microscopic [[id number]]  (Abnormal) Collected: 09/28/24 1512    Lab Status: Final result Specimen: Urine, Clean Catch Updated: 09/28/24 1536     RBC, UA None Seen /hpf      WBC, UA 0-5 /hpf      Epithelial Cells Moderate /hpf      Bacteria, UA Innumerable /hpf      MUCUS THREADS Innumerable    UA (URINE) with reflex to Scope [[id number]]  (Abnormal) Collected: 09/28/24 1512    Lab Status: Final result Specimen:  Urine, Clean Catch Updated: 09/28/24 1522     Color, UA Yellow     Clarity, UA Slightly Cloudy     Specific Gravity, UA 1.025     pH, UA 6.0     Leukocytes, UA Negative     Nitrite, UA Negative     Protein, UA Negative mg/dl      Glucose, UA Negative mg/dl      Ketones, UA Negative mg/dl      Urobilinogen, UA <2.0 mg/dl      Bilirubin, UA Negative     Occult Blood, UA Trace    Comprehensive metabolic panel [[id number]] Collected: 09/28/24 1341    Lab Status: Final result Specimen: Blood from Arm, Right Updated: 09/28/24 1403     Sodium 138 mmol/L      Potassium 3.8 mmol/L      Chloride 104 mmol/L      CO2 28 mmol/L      ANION GAP 6 mmol/L      BUN 14 mg/dL      Creatinine 0.71 mg/dL      Glucose 86 mg/dL      Calcium 9.1 mg/dL      AST 21 U/L      ALT 20 U/L      Alkaline Phosphatase 66 U/L      Total Protein 7.5 g/dL      Albumin 4.2 g/dL      Total Bilirubin 0.45 mg/dL      eGFR 94 ml/min/1.73sq m     Narrative:      National Kidney Disease Foundation guidelines for Chronic Kidney Disease (CKD):     Stage 1 with normal or high GFR (GFR > 90 mL/min/1.73 square meters)    Stage 2 Mild CKD (GFR = 60-89 mL/min/1.73 square meters)    Stage 3A Moderate CKD (GFR = 45-59 mL/min/1.73 square meters)    Stage 3B Moderate CKD (GFR = 30-44 mL/min/1.73 square meters)    Stage 4 Severe CKD (GFR = 15-29 mL/min/1.73 square meters)    Stage 5 End Stage CKD (GFR <15 mL/min/1.73 square meters)  Note: GFR calculation is accurate only with a steady state creatinine    Lipase [[id number]]  (Abnormal) Collected: 09/28/24 1341    Lab Status: Final result Specimen: Blood from Arm, Right Updated: 09/28/24 1403     Lipase 9 u/L     CBC and differential [[id number]]  (Abnormal) Collected: 09/28/24 1341    Lab Status: Final result Specimen: Blood from Arm, Right Updated: 09/28/24 1346     WBC 7.84 Thousand/uL      RBC 4.53 Million/uL      Hemoglobin 12.2 g/dL      Hematocrit 39.5 %      MCV 87 fL      MCH 26.9 pg      MCHC 30.9 g/dL      RDW 14.5  %      MPV 10.6 fL      Platelets 267 Thousands/uL      nRBC 0 /100 WBCs      Segmented % 48 %      Immature Grans % 0 %      Lymphocytes % 43 %      Monocytes % 6 %      Eosinophils Relative 2 %      Basophils Relative 1 %      Absolute Neutrophils 3.80 Thousands/µL      Absolute Immature Grans 0.03 Thousand/uL      Absolute Lymphocytes 3.36 Thousands/µL      Absolute Monocytes 0.49 Thousand/µL      Eosinophils Absolute 0.12 Thousand/µL      Basophils Absolute 0.04 Thousands/µL             CT abdomen pelvis with contrast   Final Interpretation by Lu Benitez MD (09/28 1543)      Small volume free fluid within the cul-de-sac. Origin not evident.      Multiple rounded hypodensities within the uterus with mild changes compared to 2023, presumably representing leiomyomata.         Workstation performed: [id number]             Procedures    ED Medication and Procedure Management   Prior to Admission Medications   Prescriptions Last Dose Informant Patient Reported? Taking?   PARoxetine (PAXIL) 20 mg tablet 9/27/2024  No Yes   Sig: Take 1 tablet (20 mg total) by mouth daily      Facility-Administered Medications: None     Discharge Medication List as of 9/28/2024  4:09 PM        CONTINUE these medications which have NOT CHANGED    Details   PARoxetine (PAXIL) 20 mg tablet Take 1 tablet (20 mg total) by mouth daily, Starting Tue 3/19/2024, Normal           No discharge procedures on file.  ED SEPSIS DOCUMENTATION   Time reflects when diagnosis was documented in both MDM as applicable and the Disposition within this note       Time User Action Codes Description Comment    9/28/2024  4:08 PM Sarah Beth Kumari Add [R10.9] Abdominal pain                  Sarah Beth Kumari MD  09/28/24 1897

## 2024-09-29 ENCOUNTER — TELEPHONE (OUTPATIENT)
Age: 59
End: 2024-09-29

## 2024-09-29 NOTE — TELEPHONE ENCOUNTER
Attempted Contacting Patient regarding recent ED Visit on 9/28/24.    Left message asking Patient to call the office to schedule Follow up appointment. Provided office hours and phone number.     ED:[facility]  CC: Abdominal Pain  DX:Abdominal Pain  Time:4:08pm    Last OV:09/12/24

## 2024-10-01 LAB — BACTERIA UR CULT: NORMAL

## 2024-10-29 ENCOUNTER — TELEPHONE (OUTPATIENT)
Age: 59
End: 2024-10-29

## 2024-10-29 NOTE — TELEPHONE ENCOUNTER
"Hello,    The following message was sent via e-mail to the leadership team:     Please advise if you can help facilitate the following overbook request:    Patient Name: Delfina Pathak    Patient MRN: [id number]     Call back #: [phone]     Insurance: [employer]    Department:Cardiology    Speciality: General Cardiology    Reason for overbook request: PATIENT REQUEST    Comments (Write \"N/a\" if no comments): Patient has a referral from Dr Khan for Tachycardia .  She can only go to the [facility] office.    Requested doctor and location: any doctor/[facility] office    Date of current appointment: no available appointments until Jan 2025      Thank you.      "

## 2024-11-26 ENCOUNTER — CONSULT (OUTPATIENT)
Dept: CARDIOLOGY CLINIC | Facility: CLINIC | Age: 59
End: 2024-11-26
Payer: COMMERCIAL

## 2024-11-26 VITALS
OXYGEN SATURATION: 99 % | SYSTOLIC BLOOD PRESSURE: 110 MMHG | HEIGHT: 64 IN | BODY MASS INDEX: 35.85 KG/M2 | WEIGHT: 210 LBS | HEART RATE: 89 BPM | DIASTOLIC BLOOD PRESSURE: 72 MMHG

## 2024-11-26 DIAGNOSIS — R94.31 EKG ABNORMALITIES: ICD-10-CM

## 2024-11-26 DIAGNOSIS — R00.0 TACHYCARDIA: Primary | ICD-10-CM

## 2024-11-26 PROCEDURE — 93000 ELECTROCARDIOGRAM COMPLETE: CPT | Performed by: INTERNAL MEDICINE

## 2024-11-26 PROCEDURE — 99203 OFFICE O/P NEW LOW 30 MIN: CPT | Performed by: INTERNAL MEDICINE

## 2024-11-26 NOTE — PROGRESS NOTES
[facility]  [address]  [address], [address]  [facility] [place] [facility] [address]      Delfina Pathak  1965  [id number]    Assessment & Plan  Tachycardia  24 Hour Holter to evaluate tachycardia.    EKG abnormalities  Nonspecific EKG abnormalities . Echocardiogram to evaluate for LV function and regional wall motion. C/o sob upon awakening.        Delfina Pathak is a 58 y.o. female who presents with no significant cardiovascular history with c/o tachycardia upon awakening in the morning. She denies chest pain or shortness of breath. She does not exercise but works as a  and reports her pulse is about 100 bpm during work. Her BMI is 36 kg/m2 and she denies snoring at nite. Consider evaluation for sleep apnea if cardiac evaluation is unremarkable.    EKG NSR with poor R wave progression and q waves in 3 , avf. No acute changes.      Continue all medications. Previous studies reviewed with patient, medications reviewed and possible side effects discussed. Continue risk factor modification. Optimize weight, regular exercise and follow up with appropriate specialists and primary care physician as discussed.  All questions answered. Patient advised to report any problems prompting to medical attention. Return for follow up visit in prn or earlier if needed    No chief complaint on file.        Objective           PMH:     Patient Active Problem List   Diagnosis    Depression    Anxiety    Acquired pes planus of left foot    Plantar fasciitis     Past Medical History:   Diagnosis Date    Anxiety     Closed fracture of left lateral malleolus     last assessed - 27Jan2014    Depression     Seasonal allergies      Social History     Socioeconomic History    Marital status: Single     Spouse name: Not on file    Number of children: Not on file    Years of education: Not on file    Highest education level: Not on file   Occupational History    Not on file   Tobacco Use    Smoking status: Never    Smokeless  tobacco: Never   Vaping Use    Vaping status: Never Used   Substance and Sexual Activity    Alcohol use: No     Comment: socially    Drug use: Not Currently     Types: Marijuana     Comment: hx marijuana use-quit 1990    Sexual activity: Yes     Partners: Male     Birth control/protection: None, Post-menopausal   Other Topics Concern    Not on file   Social History Narrative    Not on file     Social Drivers of Health     Financial Resource Strain: Low Risk  (9/12/2024)    Overall Financial Resource Strain (CARDIA)     Difficulty of Paying Living Expenses: Not hard at all   Food Insecurity: No Food Insecurity (9/12/2024)    Nursing - Inadequate Food Risk Classification     Worried About Running Out of Food in the Last Year: Never true     Ran Out of Food in the Last Year: Never true     Ran Out of Food in the Last Year: Not on file   Transportation Needs: No Transportation Needs (9/12/2024)    PRAPARE - Transportation     Lack of Transportation (Medical): No     Lack of Transportation (Non-Medical): No   Physical Activity: Not on file   Stress: Not on file   Social Connections: Not on file   Intimate Partner Violence: Not on file   Housing Stability: Low Risk  (9/12/2024)    Housing Stability Vital Sign     Unable to Pay for Housing in the Last Year: No     Number of Times Moved in the Last Year: 0     Homeless in the Last Year: No      Family History   Problem Relation Age of Onset    Arthritis Family     Hypertension Mother     Heart attack Father     No Known Problems Sister     No Known Problems Daughter     No Known Problems Daughter     No Known Problems Maternal Aunt     No Known Problems Maternal Aunt     No Known Problems Paternal Aunt      Past Surgical History:   Procedure Laterality Date    COLONOSCOPY      [place] HYSTEROSCOPY BX ENDOMETRIUM&/POLYPC W/WO D&C N/A 1/8/2024    Procedure: DILATATION AND CURETTAGE (D&C) WITH HYSTEROSCOPY,  POLYPECTOMY.;  Surgeon: Todd Negron MD;  Location:  MAIN OR;   Service: Gynecology    WISDOM TOOTH EXTRACTION         Current Outpatient Medications:     PARoxetine (PAXIL) 20 mg tablet, Take 1 tablet (20 mg total) by mouth daily, Disp: 90 tablet, Rfl: 2  No Known Allergies      Review of Systems   Constitutional:  Negative for chills and fever.   HENT:  Negative for ear pain and sore throat.    Eyes:  Negative for pain and visual disturbance.   Respiratory:  Positive for shortness of breath (SOB upon awakening in the am). Negative for cough.    Cardiovascular:  Positive for palpitations. Negative for chest pain.   Gastrointestinal:  Negative for abdominal pain and vomiting.   Genitourinary:  Negative for dysuria and hematuria.   Musculoskeletal:  Negative for arthralgias and back pain.   Skin:  Negative for color change and rash.   Neurological:  Negative for seizures and syncope.   All other systems reviewed and are negative.      There were no vitals taken for this visit.    Physical Exam  Constitutional:       Appearance: Normal appearance.   HENT:      Head: Normocephalic and atraumatic.   Cardiovascular:      Rate and Rhythm: Normal rate and regular rhythm.      Pulses: Normal pulses.      Heart sounds: Normal heart sounds.   Pulmonary:      Effort: Pulmonary effort is normal.      Breath sounds: Normal breath sounds.   Abdominal:      General: Abdomen is flat. Bowel sounds are normal.      Palpations: Abdomen is soft.   Musculoskeletal:         General: Normal range of motion.      Cervical back: Normal range of motion and neck supple.   Skin:     General: Skin is warm and dry.   Neurological:      General: No focal deficit present.      Mental Status: She is alert and oriented to person, place, and time.   Psychiatric:         Mood and Affect: Mood normal.         Behavior: Behavior normal.

## 2024-12-12 ENCOUNTER — ANNUAL EXAM (OUTPATIENT)
Dept: OBGYN CLINIC | Facility: CLINIC | Age: 59
End: 2024-12-12
Payer: COMMERCIAL

## 2024-12-12 VITALS
HEIGHT: 64 IN | WEIGHT: 211 LBS | BODY MASS INDEX: 36.02 KG/M2 | DIASTOLIC BLOOD PRESSURE: 80 MMHG | SYSTOLIC BLOOD PRESSURE: 128 MMHG

## 2024-12-12 DIAGNOSIS — Z01.419 WOMEN'S ANNUAL ROUTINE GYNECOLOGICAL EXAMINATION: Primary | ICD-10-CM

## 2024-12-12 PROCEDURE — 99396 PREV VISIT EST AGE 40-64: CPT | Performed by: OBSTETRICS & GYNECOLOGY

## 2024-12-12 NOTE — PROGRESS NOTES
"Nakia Pathak is a 59 y.o. female who presents for annual well woman exam.   No PMB     History of abnormal Pap smear: no  Family history of uterine or ovarian cancer: no  Family history of breast cancer: no    Menstrual History:  OB History          2    Para   2    Term   2            AB        Living   2         SAB        IAB        Ectopic        Multiple        Live Births   2           Obstetric Comments   2                 No LMP recorded. Patient is perimenopausal.       The following portions of the patient's history were reviewed and updated as appropriate: allergies, current medications, past family history, past medical history, past social history, past surgical history, and problem list.    Review of Systems  Review of Systems   Constitutional:  Negative for activity change, appetite change, chills, fatigue and fever.   Respiratory:  Negative for apnea, cough, chest tightness and shortness of breath.    Cardiovascular:  Negative for chest pain, palpitations and leg swelling.   Gastrointestinal:  Negative for abdominal pain, constipation, diarrhea, nausea and vomiting.   Genitourinary:  Negative for difficulty urinating, dysuria, flank pain, frequency, hematuria and urgency.   Neurological:  Negative for dizziness, seizures, syncope, light-headedness, numbness and headaches.   Psychiatric/Behavioral:  Negative for agitation and confusion.           Objective      /80 (BP Location: Left arm, Patient Position: Sitting, Cuff Size: Adult)   Ht 5' 4\" (1.626 m)   Wt 95.7 kg (211 lb)   BMI 36.22 kg/m²     Physical Exam  OBGyn Exam     General:   alert and oriented, in no acute distress, alert, appears stated age, and cooperative   Heart: regular rate and rhythm, S1, S2 normal, no murmur, click, rub or gallop   Lungs: clear to auscultation bilaterally   Abdomen: soft, non-tender, without masses or organomegaly   Vulva: normal   Vagina: normal mucosa, normal discharge "   Cervix: Complete uterovaginal prolapse cervix at the level of the os   Uterus: Complete uterovaginal prolapse , mildly enlarged   Adnexa:  Breast Exam:  no mass, fullness, tenderness  breasts appear normal, no suspicious masses, no skin or nipple changes or axillary nodes.                Assessment      @well woman@ .   59-year-old female    uterovaginal prolapse third/fourth degree\respond to pessary   Had episodes of perimenopausal bleeding  Fibroid uterus  Pessary ring with support 4.  Plan  Pap -2023  Diet/exercise  Calcium/vitamin-D  Mammogram  Up-to-date with colonoscopy   continue doing Kegel exercise  Return to office for annual exam       All questions answered.     There are no Patient Instructions on file for this visit.

## 2024-12-16 ENCOUNTER — HOSPITAL ENCOUNTER (OUTPATIENT)
Dept: NON INVASIVE DIAGNOSTICS | Facility: HOSPITAL | Age: 59
Discharge: HOME | End: 2024-12-16
Attending: INTERNAL MEDICINE

## 2025-01-02 ENCOUNTER — HOSPITAL ENCOUNTER (OUTPATIENT)
Dept: NON INVASIVE DIAGNOSTICS | Facility: HOSPITAL | Age: 60
Discharge: HOME | End: 2025-01-02
Attending: INTERNAL MEDICINE
Payer: COMMERCIAL

## 2025-01-02 VITALS
HEART RATE: 89 BPM | HEIGHT: 64 IN | WEIGHT: 211 LBS | SYSTOLIC BLOOD PRESSURE: 128 MMHG | BODY MASS INDEX: 36.02 KG/M2 | DIASTOLIC BLOOD PRESSURE: 80 MMHG

## 2025-01-02 DIAGNOSIS — R00.0 TACHYCARDIA: ICD-10-CM

## 2025-01-02 LAB
AORTIC ROOT: 3.1 CM
APICAL FOUR CHAMBER EJECTION FRACTION: 60 %
AV LVOT PEAK GRADIENT: 2 MMHG
AV PEAK GRADIENT: 7 MMHG
BSA FOR ECHO PROCEDURE: 2 M2
DOP CALC LVOT AREA: 3.8 CM2
DOP CALC LVOT DIAMETER: 2.2 CM
E WAVE DECELERATION TIME: 220 MS
E/A RATIO: 0.82
FRACTIONAL SHORTENING: 34 (ref 28–44)
INTERVENTRICULAR SEPTUM IN DIASTOLE (PARASTERNAL SHORT AXIS VIEW): 1.2 CM
INTERVENTRICULAR SEPTUM: 1.2 CM (ref 0.6–1.1)
LAAS-AP2: 18.9 CM2
LAAS-AP4: 20.9 CM2
LEFT ATRIUM SIZE: 3.7 CM
LEFT ATRIUM VOLUME (MOD BIPLANE): 58 ML
LEFT ATRIUM VOLUME INDEX (MOD BIPLANE): 29 ML/M2
LEFT INTERNAL DIMENSION IN SYSTOLE: 2.9 CM (ref 2.1–4)
LEFT VENTRICULAR INTERNAL DIMENSION IN DIASTOLE: 4.4 CM (ref 3.5–6)
LEFT VENTRICULAR POSTERIOR WALL IN END DIASTOLE: 0.9 CM
LEFT VENTRICULAR STROKE VOLUME: 58 ML
LVSV (TEICH): 58 ML
MV E'TISSUE VEL-SEP: 8 CM/S
MV PEAK A VEL: 0.8 M/S
MV PEAK E VEL: 66 CM/S
MV STENOSIS PRESSURE HALF TIME: 64 MS
MV VALVE AREA P 1/2 METHOD: 3.44
RIGHT ATRIUM AREA SYSTOLE A4C: 22.7 CM2
RIGHT VENTRICLE ID DIMENSION: 3.9 CM
SL CV LEFT ATRIUM LENGTH A2C: 5.2 CM
SL CV LV EF: 55
SL CV PED ECHO LEFT VENTRICLE DIASTOLIC VOLUME (MOD BIPLANE) 2D: 90 ML
SL CV PED ECHO LEFT VENTRICLE SYSTOLIC VOLUME (MOD BIPLANE) 2D: 32 ML
TR MAX PG: 19 MMHG
TR PEAK VELOCITY: 2.2 M/S
TRICUSPID ANNULAR PLANE SYSTOLIC EXCURSION: 2.5 CM
TRICUSPID VALVE PEAK REGURGITATION VELOCITY: 2.18 M/S

## 2025-01-02 PROCEDURE — 93306 TTE W/DOPPLER COMPLETE: CPT

## 2025-01-02 PROCEDURE — 93226 XTRNL ECG REC<48 HR SCAN A/R: CPT

## 2025-01-02 PROCEDURE — 93225 XTRNL ECG REC<48 HRS REC: CPT

## 2025-01-02 PROCEDURE — 93306 TTE W/DOPPLER COMPLETE: CPT | Performed by: INTERNAL MEDICINE

## 2025-01-03 ENCOUNTER — RESULTS FOLLOW-UP (OUTPATIENT)
Dept: CARDIOLOGY CLINIC | Facility: CLINIC | Age: 60
End: 2025-01-03

## 2025-01-03 NOTE — TELEPHONE ENCOUNTER
----- Message from SOLEDAD Bailey sent at 1/3/2025  8:43 AM EST -----  Please let patient know that her echocardiogram demonstrates normal EF and strong heart function. Her right atrium is mildly dilated and this is most likely due to obesity and concerning for component of obstructive sleep apnea. Would recommend weight loss and if she has never been tested for sleep apnea she should follow-up. Thanks Juliana

## 2025-01-06 DIAGNOSIS — F32.A DEPRESSION, CONTROLLED: ICD-10-CM

## 2025-01-06 RX ORDER — PAROXETINE 20 MG/1
20 TABLET, FILM COATED ORAL DAILY
Qty: 90 TABLET | Refills: 2 | Status: SHIPPED | OUTPATIENT
Start: 2025-01-06

## 2025-01-16 ENCOUNTER — HOSPITAL ENCOUNTER (OUTPATIENT)
Dept: RADIOLOGY | Facility: HOSPITAL | Age: 60
Discharge: HOME | End: 2025-01-16
Attending: OBSTETRICS & GYNECOLOGY
Payer: COMMERCIAL

## 2025-01-16 VITALS — HEIGHT: 64 IN | WEIGHT: 211 LBS | BODY MASS INDEX: 36.02 KG/M2

## 2025-01-16 DIAGNOSIS — R92.8 ABNORMAL MAMMOGRAM: ICD-10-CM

## 2025-01-16 PROCEDURE — 77065 DX MAMMO INCL CAD UNI: CPT

## 2025-01-16 PROCEDURE — G0279 TOMOSYNTHESIS, MAMMO: HCPCS

## 2025-01-19 ENCOUNTER — RESULTS FOLLOW-UP (OUTPATIENT)
Dept: OBGYN CLINIC | Facility: CLINIC | Age: 60
End: 2025-01-19

## 2025-05-19 ENCOUNTER — OFFICE VISIT (OUTPATIENT)
Age: 60
End: 2025-05-19

## 2025-05-19 VITALS
TEMPERATURE: 99 F | WEIGHT: 198 LBS | HEART RATE: 84 BPM | BODY MASS INDEX: 33.99 KG/M2 | SYSTOLIC BLOOD PRESSURE: 115 MMHG | DIASTOLIC BLOOD PRESSURE: 76 MMHG

## 2025-05-19 DIAGNOSIS — F32.A DEPRESSION, UNSPECIFIED DEPRESSION TYPE: Primary | ICD-10-CM

## 2025-05-19 PROCEDURE — 99213 OFFICE O/P EST LOW 20 MIN: CPT | Performed by: FAMILY MEDICINE

## 2025-05-19 NOTE — ASSESSMENT & PLAN NOTE
"Chronic     Works at [employer] at [employer].   Feels as if her job is very stressful.   Home medication paxil 20 mg daily.   Requesting a note for work stating that she has depression  Denies suicidal ideations but did state \"I would rather be with my dad who passed away but I never thought of hurting myself\". Denies any plan on hurting herself.    PHQ 9 is 16 today.     Continue home medication  Recommended meditation, yoga   Offered therapy; refused at this time   Provided note stating that patient has depression       "

## 2025-05-19 NOTE — PROGRESS NOTES
"Name: Delfina Pathak      : 1965      MRN: [id number]  Encounter Provider: Rocky Wilkerson MD  Encounter Date: 2025   Encounter department: [facility]  :  Assessment & Plan  Depression, unspecified depression type  Chronic     Works at [employer] at [employer].   Feels as if her job is very stressful.   Home medication paxil 20 mg daily.   Requesting a note for work stating that she has depression  Denies suicidal ideations but did state \"I would rather be with my dad who passed away but I never thought of hurting myself\". Denies any plan on hurting herself.    PHQ 9 is 16 today.     Continue home medication  Recommended meditation, yoga   Offered therapy; refused at this time   Provided note stating that patient has depression           BMI Counseling: Body mass index is 33.99 kg/m². The BMI is above normal. Nutrition recommendations include decreasing portion sizes, encouraging healthy choices of fruits and vegetables, decreasing fast food intake, consuming healthier snacks, increasing intake of lean protein and reducing intake of saturated and trans fat. Exercise recommendations include exercising 3-5 times per week. Rationale for BMI follow-up plan is due to patient being overweight or obese.     Depression Screening and Follow-up Plan: Patient's depression screening was positive with a PHQ-9 score of 16.   Patient assessed for underlying major depression. Brief counseling provided and recommend additional follow-up/re-evaluation next office visit.       History of Present Illness   Here to follow up depression.      Review of Systems   Constitutional:  Negative for chills and fever.   HENT:  Negative for ear pain and sore throat.    Eyes:  Negative for pain and visual disturbance.   Respiratory:  Negative for cough and shortness of breath.    Cardiovascular:  Negative for chest pain and palpitations.   Gastrointestinal:  Negative for abdominal pain and vomiting. "   Genitourinary:  Negative for dysuria and hematuria.   Musculoskeletal:  Negative for arthralgias and back pain.   Skin:  Negative for color change and rash.   Neurological:  Negative for seizures and syncope.   Psychiatric/Behavioral:  Negative for self-injury and suicidal ideas. The patient is not nervous/anxious.    All other systems reviewed and are negative.      Objective   /76 (BP Location: Right arm, Patient Position: Sitting, Cuff Size: Large)   Pulse 84   Temp 99 °F (37.2 °C) (Tympanic)   Wt 89.8 kg (198 lb)   LMP 12/21/2023 (Exact Date) Comment: irregular menses  BMI 33.99 kg/m²      Physical Exam  Constitutional:       Appearance: Normal appearance.   HENT:      Head: Normocephalic and atraumatic.      Mouth/Throat:      Mouth: Mucous membranes are moist.     Eyes:      General:         Right eye: No discharge.         Left eye: No discharge.      Conjunctiva/sclera: Conjunctivae normal.       Cardiovascular:      Rate and Rhythm: Normal rate and regular rhythm.      Pulses: Normal pulses.      Heart sounds: Normal heart sounds.   Pulmonary:      Effort: Pulmonary effort is normal. No respiratory distress.      Breath sounds: Normal breath sounds.   Abdominal:      General: Bowel sounds are normal.      Palpations: Abdomen is soft.      Tenderness: There is no abdominal tenderness.     Musculoskeletal:      Cervical back: Neck supple.      Right lower leg: No edema.      Left lower leg: No edema.     Skin:     General: Skin is warm and dry.      Capillary Refill: Capillary refill takes less than 2 seconds.     Neurological:      Mental Status: She is alert.     Psychiatric:         Mood and Affect: Mood is anxious.

## 2025-05-19 NOTE — LETTER
May 19, 2025     Patient: Delfina Pathak  YOB: 1965  Date of Visit: 5/19/2025      To Whom it May Concern:    Delfina Pathak is under my professional care. Delfina was seen in my office on 5/19/2025. Delfina has been diagnosed with depression and undergoing treatment at this time.     If you have any questions or concerns, please don't hesitate to call.         Sincerely,          Rocky Wilkerson MD        CC: No Recipients

## 2025-07-07 ENCOUNTER — OFFICE VISIT (OUTPATIENT)
Dept: URGENT CARE | Facility: CLINIC | Age: 60
End: 2025-07-07
Payer: COMMERCIAL

## 2025-07-07 VITALS
DIASTOLIC BLOOD PRESSURE: 74 MMHG | BODY MASS INDEX: 35.19 KG/M2 | HEART RATE: 90 BPM | WEIGHT: 205 LBS | TEMPERATURE: 98.9 F | SYSTOLIC BLOOD PRESSURE: 118 MMHG | OXYGEN SATURATION: 100 % | RESPIRATION RATE: 16 BRPM

## 2025-07-07 DIAGNOSIS — R31.9 URINARY TRACT INFECTION WITH HEMATURIA, SITE UNSPECIFIED: Primary | ICD-10-CM

## 2025-07-07 DIAGNOSIS — L03.012 PARONYCHIA OF LEFT MIDDLE FINGER: ICD-10-CM

## 2025-07-07 DIAGNOSIS — N39.0 URINARY TRACT INFECTION WITH HEMATURIA, SITE UNSPECIFIED: Primary | ICD-10-CM

## 2025-07-07 LAB
SL AMB  POCT GLUCOSE, UA: ABNORMAL
SL AMB LEUKOCYTE ESTERASE,UA: ABNORMAL
SL AMB POCT BILIRUBIN,UA: ABNORMAL
SL AMB POCT BLOOD,UA: ABNORMAL
SL AMB POCT CLARITY,UA: ABNORMAL
SL AMB POCT COLOR,UA: ABNORMAL
SL AMB POCT KETONES,UA: ABNORMAL
SL AMB POCT NITRITE,UA: ABNORMAL
SL AMB POCT PH,UA: 5
SL AMB POCT SPECIFIC GRAVITY,UA: 1.01
SL AMB POCT URINE PROTEIN: ABNORMAL
SL AMB POCT UROBILINOGEN: 0.2

## 2025-07-07 PROCEDURE — 87186 SC STD MICRODIL/AGAR DIL: CPT | Performed by: PHYSICIAN ASSISTANT

## 2025-07-07 PROCEDURE — 99213 OFFICE O/P EST LOW 20 MIN: CPT | Performed by: PHYSICIAN ASSISTANT

## 2025-07-07 PROCEDURE — 87077 CULTURE AEROBIC IDENTIFY: CPT | Performed by: PHYSICIAN ASSISTANT

## 2025-07-07 PROCEDURE — 87086 URINE CULTURE/COLONY COUNT: CPT | Performed by: PHYSICIAN ASSISTANT

## 2025-07-07 PROCEDURE — 81002 URINALYSIS NONAUTO W/O SCOPE: CPT | Performed by: PHYSICIAN ASSISTANT

## 2025-07-07 RX ORDER — CEPHALEXIN 500 MG/1
500 CAPSULE ORAL EVERY 8 HOURS SCHEDULED
Qty: 15 CAPSULE | Refills: 0 | Status: SHIPPED | OUTPATIENT
Start: 2025-07-07 | End: 2025-07-12

## 2025-07-07 NOTE — PROGRESS NOTES
[facility]  Name: Delfina Pathak      : 1965      MRN: [id number]  Encounter Provider: Kenya Toro PA-C  Encounter Date: 2025   Encounter department: [facility] [place]  :  Assessment & Plan  Urinary tract infection with hematuria, site unspecified    Orders:    POCT urine dip    Urine culture    cephalexin (KEFLEX) 500 mg capsule; Take 1 capsule (500 mg total) by mouth every 8 (eight) hours for 5 days    Paronychia of left middle finger    Orders:    cephalexin (KEFLEX) 500 mg capsule; Take 1 capsule (500 mg total) by mouth every 8 (eight) hours for 5 days    Encouraged warm soaks.  Will send Keflex to cover for both infections. Discussed strict return to care precautions as well as red flag symptoms which should prompt immediate ED referral. Pt verbalized understanding and is in agreement with plan.  Please follow up with your primary care provider within the next week. Please remember that your visit today was with an urgent care provider and should not replace follow up with your primary care provider for chronic medical issues or annual physicals.         Patient Instructions  Follow up with PCP in 3-5 days.  Proceed to  ER if symptoms worsen.    If tests are performed, our office will contact you with results only if changes need to made to the care plan discussed with you at the visit. [facility]    Chief Complaint:   Chief Complaint   Patient presents with    Possible UTI     Pt presents with frequency urination, painful, started last night; left hand third digit nailbed wound check     History of Present Illness   Patient is a 59-year-old female presenting with dysuria, frequency x 1 day.  Had an episode of hematuria this morning.  Denies nausea/vomiting, abdominal pain, flank pain, fever.  Last UTI was years ago.  Additionally is complaining of left middle finger pain after taking off her fake nails.  Has been soaking and  baking soda and vinegar.          Review of Systems   Constitutional:  Negative for chills, diaphoresis and fever.   Respiratory:  Negative for shortness of breath.    Cardiovascular:  Negative for chest pain.   Gastrointestinal:  Negative for abdominal pain, nausea and vomiting.   Genitourinary:  Positive for dysuria, frequency and hematuria. Negative for flank pain and urgency.   Musculoskeletal:  Negative for back pain and myalgias.   Psychiatric/Behavioral:  Negative for confusion.      Past Medical History   Past Medical History[1]  Past Surgical History[2]  Family History[3]  she reports that she has never smoked. She has been exposed to tobacco smoke. She has never used smokeless tobacco. She reports current alcohol use. She reports that she does not currently use drugs after having used the following drugs: Marijuana.  Current Outpatient Medications   Medication Instructions    cephalexin (KEFLEX) 500 mg, Oral, Every 8 hours scheduled    PARoxetine (PAXIL) 20 mg, Oral, Daily   Allergies[4]     Objective   /74   Pulse 90   Temp 98.9 °F (37.2 °C)   Resp 16   Wt 93 kg (205 lb)   LMP 12/21/2023 (Exact Date) Comment: irregular menses  SpO2 100%   BMI 35.19 kg/m²      Physical Exam  Vitals and nursing note reviewed.   Constitutional:       General: She is not in acute distress.     Appearance: Normal appearance. She is not ill-appearing.   HENT:      Head: Normocephalic and atraumatic.      Mouth/Throat:      Mouth: Mucous membranes are moist.      Pharynx: Oropharynx is clear.     Eyes:      Conjunctiva/sclera: Conjunctivae normal.       Cardiovascular:      Rate and Rhythm: Normal rate and regular rhythm.      Heart sounds: Normal heart sounds.   Pulmonary:      Effort: Pulmonary effort is normal. No respiratory distress.      Breath sounds: Normal breath sounds. No wheezing, rhonchi or rales.   Abdominal:      General: Abdomen is flat.      Palpations: Abdomen is soft.      Tenderness: There is no  "abdominal tenderness. There is no right CVA tenderness or left CVA tenderness.     Skin:     General: Skin is warm and dry.      Capillary Refill: Capillary refill takes less than 2 seconds.      Findings: Erythema (Left third digit cuticle is erythematous and tender with some purulent drainage noted underneath nail) present.     Neurological:      Mental Status: She is alert and oriented to person, place, and time.     Psychiatric:         Behavior: Behavior normal.         Portions of the record may have been created with voice recognition software.  Occasional wrong word or \"sound a like\" substitutions may have occurred due to the inherent limitations of voice recognition software.  Read the chart carefully and recognize, using context, where substitutions have occurred.       [1]   Past Medical History:  Diagnosis Date    Anxiety     Closed fracture of left lateral malleolus     last assessed - 27Jan2014    Depression     Seasonal allergies    [2]   Past Surgical History:  Procedure Laterality Date    COLONOSCOPY      VA HYSTEROSCOPY BX ENDOMETRIUM&/POLYPC W/WO D&C N/A 1/8/2024    Procedure: DILATATION AND CURETTAGE (D&C) WITH HYSTEROSCOPY,  POLYPECTOMY.;  Surgeon: Todd Negron MD;  Location:  MAIN OR;  Service: Gynecology    WISDOM TOOTH EXTRACTION     [3]   Family History  Problem Relation Name Age of Onset    Arthritis Family      Hypertension Mother      Heart attack Father      No Known Problems Sister      No Known Problems Daughter      No Known Problems Daughter      No Known Problems Maternal Aunt      No Known Problems Maternal Aunt      No Known Problems Paternal Aunt     [4] No Known Allergies    "

## 2025-07-09 LAB — BACTERIA UR CULT: ABNORMAL

## 2025-07-17 ENCOUNTER — HOSPITAL ENCOUNTER (OUTPATIENT)
Dept: RADIOLOGY | Facility: HOSPITAL | Age: 60
Discharge: HOME | End: 2025-07-17
Attending: OBSTETRICS & GYNECOLOGY
Payer: COMMERCIAL

## 2025-07-17 DIAGNOSIS — R92.8 ABNORMAL MAMMOGRAM: ICD-10-CM

## 2025-07-17 PROCEDURE — 77066 DX MAMMO INCL CAD BI: CPT

## 2025-07-17 PROCEDURE — G0279 TOMOSYNTHESIS, MAMMO: HCPCS

## 2025-07-18 ENCOUNTER — OFFICE VISIT (OUTPATIENT)
Age: 60
End: 2025-07-18

## 2025-07-18 ENCOUNTER — TELEPHONE (OUTPATIENT)
Age: 60
End: 2025-07-18

## 2025-07-18 VITALS
BODY MASS INDEX: 33.99 KG/M2 | WEIGHT: 198 LBS | SYSTOLIC BLOOD PRESSURE: 114 MMHG | DIASTOLIC BLOOD PRESSURE: 77 MMHG | OXYGEN SATURATION: 98 % | RESPIRATION RATE: 18 BRPM | TEMPERATURE: 98.4 F | HEART RATE: 94 BPM

## 2025-07-18 DIAGNOSIS — B35.1 FUNGAL INFECTION OF NAIL: ICD-10-CM

## 2025-07-18 DIAGNOSIS — B35.1 FUNGAL INFECTION OF NAIL: Primary | ICD-10-CM

## 2025-07-18 PROCEDURE — 99213 OFFICE O/P EST LOW 20 MIN: CPT | Performed by: FAMILY MEDICINE

## 2025-07-18 RX ORDER — PRENATAL VIT 91/IRON/FOLIC/DHA 28-975-200
COMBINATION PACKAGE (EA) ORAL 2 TIMES DAILY
Qty: 15 G | Refills: 1 | Status: CANCELLED | OUTPATIENT
Start: 2025-07-18

## 2025-07-18 RX ORDER — TERBINAFINE HYDROCHLORIDE 250 MG/1
250 TABLET ORAL DAILY
Qty: 42 TABLET | Refills: 0 | Status: SHIPPED | OUTPATIENT
Start: 2025-07-18 | End: 2025-08-29

## 2025-07-18 RX ORDER — TERBINAFINE HYDROCHLORIDE 250 MG/1
250 TABLET ORAL DAILY
Qty: 30 TABLET | Refills: 1 | OUTPATIENT
Start: 2025-07-18

## 2025-07-18 NOTE — PROGRESS NOTES
Name: Delfina Pathak      : 1965      MRN: [id number]  Encounter Provider: Paz Parmar DO  Encounter Date: 2025   Encounter department: [facility]  :  Assessment & Plan  Fungal infection of nail  Patient had a blue/green nailbed, with most of the nail peeled off.   Denies inciting trauma, h/o similar complaint, excruciating pain  Advised to not put any fake nails on  Advised to keep the finger open to air; and do not place Band-Aids on it  Prescribed antifungal pills  Blood work ordered to be collected in 2 weeks; to monitor liver enzymes on treatment  Follow-up in 4 weeks to monitor symptom progression  Orders:    terbinafine (LamISIL) 250 mg tablet; Take 1 tablet (250 mg total) by mouth daily    Comprehensive metabolic panel; Future           History of Present Illness   Patient is a 59 yr. old female, with PMH of anxiety, depression, and seasonal allergies who presents with complaints of an injury to a nail on the middle finger of her left hand. Patient states that she took off her fake nail a week ago after she noticed it was lifting and under the nail was green.  Patient went to an urgent care on  for a UTI and was prescribed 8 days of Keflex.  Patient admits recent Abx use   Patient denies trauma to the nail, pain, bleeding        Review of Systems   Skin:  Positive for color change (nail bed blue/green color).   Psychiatric/Behavioral:  Negative for behavioral problems and confusion.        Objective   /77 (BP Location: Right arm, Patient Position: Sitting, Cuff Size: Large)   Pulse 94   Temp 98.4 °F (36.9 °C) (Tympanic)   Resp 18   Wt 89.8 kg (198 lb)   LMP 2023 (Exact Date) Comment: irregular menses  SpO2 98%   BMI 33.99 kg/m²      Physical Exam  Vitals reviewed.   Constitutional:       General: She is not in acute distress.     Appearance: Normal appearance. She is obese. She is not ill-appearing or toxic-appearing.   HENT:       Head: Normocephalic and atraumatic.      Nose: Nose normal.      Mouth/Throat:      Mouth: Mucous membranes are moist.      Pharynx: Oropharynx is clear.     Eyes:      Conjunctiva/sclera: Conjunctivae normal.     Pulmonary:      Effort: No respiratory distress.   Abdominal:      General: Abdomen is flat. Bowel sounds are normal.      Palpations: Abdomen is soft.     Musculoskeletal:         General: Normal range of motion.      Cervical back: Normal range of motion and neck supple.     Skin:     General: Skin is warm and dry.      Findings: Bruising (blue/green color in nail bed of Lt. middle finger) present. No erythema or lesion.     Neurological:      General: No focal deficit present.      Mental Status: She is alert and oriented to person, place, and time. Mental status is at baseline.     Psychiatric:         Mood and Affect: Mood normal.         Behavior: Behavior normal.         Thought Content: Thought content normal.         Judgment: Judgment normal.

## 2025-07-18 NOTE — PATIENT INSTRUCTIONS
- in 2 weeks August 1st and after, you can get the blood work (CMP)  - take the pills, until told to stop   - return in a month for follow up

## 2025-07-18 NOTE — TELEPHONE ENCOUNTER
"Maddie Allen,    This patient came in requesting blood work to be ordered for \"everything\".   Are you able to order the labs, or will she need an appointment first?    Please advise   "

## 2025-07-18 NOTE — TELEPHONE ENCOUNTER
Morro Allen!     She was actually seen today for an infection in her finger and the labwork was ordered by Dr. Childers.    Thank you!

## 2025-08-21 LAB
ALBUMIN SERPL-MCNC: 4.5 G/DL (ref 3.6–5.1)
ALBUMIN/GLOB SERPL: 1.3 (CALC) (ref 1–2.5)
ALP SERPL-CCNC: 80 U/L (ref 37–153)
ALT SERPL-CCNC: 21 U/L (ref 6–29)
AST SERPL-CCNC: 22 U/L (ref 10–35)
BILIRUB SERPL-MCNC: 0.5 MG/DL (ref 0.2–1.2)
BUN SERPL-MCNC: 13 MG/DL (ref 7–25)
BUN/CREAT SERPL: NORMAL (CALC) (ref 6–22)
CALCIUM SERPL-MCNC: 9.4 MG/DL (ref 8.6–10.4)
CHLORIDE SERPL-SCNC: 105 MMOL/L (ref 98–110)
CO2 SERPL-SCNC: 29 MMOL/L (ref 20–32)
CREAT SERPL-MCNC: 0.64 MG/DL (ref 0.5–1.03)
GFR/BSA.PRED SERPLBLD CYS-BASED-ARV: 102 ML/MIN/1.73M2
GLOBULIN SER CALC-MCNC: 3.4 G/DL (CALC) (ref 1.9–3.7)
GLUCOSE SERPL-MCNC: 89 MG/DL (ref 65–99)
POTASSIUM SERPL-SCNC: 4.2 MMOL/L (ref 3.5–5.3)
PROT SERPL-MCNC: 7.9 G/DL (ref 6.1–8.1)
SODIUM SERPL-SCNC: 139 MMOL/L (ref 135–146)

## 2025-08-22 ENCOUNTER — RESULTS FOLLOW-UP (OUTPATIENT)
Age: 60
End: 2025-08-22

## (undated) DEVICE — GLOVE PI ULTRA TOUCH SZ.6.5

## (undated) DEVICE — STRL ALLENTOWN HYSTEROSCOPY PK: Brand: CARDINAL HEALTH

## (undated) DEVICE — PREMIUM DRY TRAY LF: Brand: MEDLINE INDUSTRIES, INC.

## (undated) DEVICE — TISSUE REMOVAL SYSTEM FLUID MANAGEMENT ACCESSORIES: Brand: SYMPHION

## (undated) DEVICE — TISSUE REMOVAL SYSTEM RESECTING DEVICE: Brand: SYMPHION

## (undated) DEVICE — CHLORHEXIDINE 4PCT 4 OZ